# Patient Record
Sex: MALE | Race: WHITE
[De-identification: names, ages, dates, MRNs, and addresses within clinical notes are randomized per-mention and may not be internally consistent; named-entity substitution may affect disease eponyms.]

---

## 2018-12-17 ENCOUNTER — HOSPITAL ENCOUNTER (EMERGENCY)
Dept: HOSPITAL 47 - EC | Age: 38
Discharge: HOME | End: 2018-12-17
Payer: COMMERCIAL

## 2018-12-17 VITALS
DIASTOLIC BLOOD PRESSURE: 80 MMHG | TEMPERATURE: 97.8 F | RESPIRATION RATE: 16 BRPM | SYSTOLIC BLOOD PRESSURE: 127 MMHG | HEART RATE: 82 BPM
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
## 2018-12-17 DIAGNOSIS — F41.9: ICD-10-CM

## 2018-12-17 DIAGNOSIS — Z23: ICD-10-CM
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
## 2018-12-17 DIAGNOSIS — T15.02XA: Primary | ICD-10-CM

## 2018-12-17 DIAGNOSIS — Z86.69: ICD-10-CM

## 2018-12-17 DIAGNOSIS — F17.200: ICD-10-CM

## 2018-12-17 DIAGNOSIS — Z79.899: ICD-10-CM

## 2018-12-17 PROCEDURE — 90471 IMMUNIZATION ADMIN: CPT

## 2018-12-17 PROCEDURE — 65220 REMOVE FOREIGN BODY FROM EYE: CPT

## 2018-12-17 PROCEDURE — 90715 TDAP VACCINE 7 YRS/> IM: CPT

## 2018-12-17 PROCEDURE — 99283 EMERGENCY DEPT VISIT LOW MDM: CPT

## 2018-12-17 NOTE — ED
General Adult HPI





- General


Chief complaint: Eye Problems


Stated complaint: POSS FB IN BOTH EYES


Time Seen by Provider: 12/17/18 12:41


Source: patient, RN notes reviewed


Mode of arrival: ambulatory


Limitations: no limitations





- History of Present Illness


Initial comments: 





38-year-old male presents to the emergency department for the chief complaint 

of foreign body in the left eye.  Patient states he was working on a bench 3 

days ago.  He denies any significant pain at that time however shortly 

afterwards stated his left eye began to hurt.  He states it is making the left 

side of his nose run.  Patient admits to pain with light in both eyes however 

states he believes this is because his left eye associated sensitive.  He 

denies any significant pain in the right eye.  Patient is not up-to-date with 

tetanus vaccination.  He denies any significant visual changes besides 

blurriness due to Pain with opening his left eye. Patient has no other 

complaints at this time including shortness of breath, chest pain, abdominal 

pain, nausea or vomiting, headache. 





- Related Data


 Home Medications











 Medication  Instructions  Recorded  Confirmed


 


Gabapentin [Neurontin] 600 mg PO HS 12/02/15 12/02/15


 


HYDROcodone/APAP 10-325MG [Norco 1 tab PO Q4HR PRN 12/02/15 12/02/15





]   








 Previous Rx's











 Medication  Instructions  Recorded


 


Meclizine [Antivert] 25 mg PO DAILY 10 Days  tab 12/02/15


 


Naproxen 500 mg PO Q12HR 14 Days  tab 12/02/15











 Allergies











Allergy/AdvReac Type Severity Reaction Status Date / Time


 


No Known Allergies Allergy   Verified 12/17/18 12:34














Review of Systems


ROS Statement: 


Those systems with pertinent positive or pertinent negative responses have been 

documented in the HPI.





ROS Other: All systems not noted in ROS Statement are negative.





Past Medical History


Additional Past Medical History / Comment(s): nerve damage, ruptured/herniated 

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


History of Any Multi-Drug Resistant Organisms: None Reported


Past Surgical History: No Surgical Hx Reported


Past Psychological History: ADD/ADHD, Anxiety


Smoking Status: Current every day smoker


Past Alcohol Use History: None Reported


Past Drug Use History: Marijuana





General Exam


Limitations: no limitations


General appearance: alert, in no apparent distress


Head exam: Present: atraumatic, normocephalic, normal inspection


Eye exam: Present: PERRL, EOMI, conjunctival injection (Mild conjunctival 

injection), other (Small sub milimeter foreign body noted in the center of the 

left cornea.  No foreign bodies evident in the right cornea.  Lids were flipped 

on both eyes.  No foreign bodies evident.).  Absent: scleral icterus, 

periorbital swelling


ENT exam: Present: normal exam, mucous membranes moist


Neck exam: Present: normal inspection.  Absent: tenderness, meningismus, 

lymphadenopathy


Respiratory exam: Present: normal lung sounds bilaterally.  Absent: respiratory 

distress, wheezes, rales, rhonchi, stridor


Cardiovascular Exam: Present: regular rate, normal rhythm, normal heart sounds.

  Absent: systolic murmur, diastolic murmur, rubs, gallop, clicks


Neurological exam: Present: alert, oriented X3, CN II-XII intact


Psychiatric exam: Present: normal affect, normal mood





Course


 Vital Signs











  12/17/18 12/17/18 12/17/18





  12:30 13:47 14:29


 


Temperature 97.8 F 97.6 F 97.8 F


 


Pulse Rate 86 81 82


 


Respiratory 16 18 16





Rate   


 


Blood Pressure 147/90 130/87 127/80


 


O2 Sat by Pulse 98 98 98





Oximetry   














Medical Decision Making





- Medical Decision Making





38-year-old male presents to the emergency department for a chief complaint of 

foreign body in the left eye.  Patient states this occurred about 3 days ago.  

He does believe it is metal.  He admits to minor irritation in the right eye 

when looking at bright lights that believes this is due to the irritation in 

his left eye which is significantly more prominent.  On exam patient does have 

a small 7 mm foreign body that appears to be metal in the left center cornea.  

Both lids were flipped on bilateral eyes and no evidence of foreign body under 

eyelids.  Proparacaine was used to numb the left eye.  Q-tip was used to remove 

the foreign body without success.  18-gauge needle did remove the foreign body 

on the first attempt.  However small rust ring does remain.  Jazzy brush was 

used to remove the rust ring.  The eye was then stained with fluorescein stain 

and Wood's lamp was used to visualize the eye.  This is the only area of 

abrasion noted.  Negative Ceasar sign.  Patient was given tetanus shot.  He was 

given erythromycin ointment here.  He does not wear contacts. I did discuss 

urgent follow-up with ophthalmologist due to possible remnants of rust ring as 

I could not completely remove the ring.  However much of the ring was removed 

without difficulty.  Patient will follow-up and return if he has any worsening 

symptoms. 





Disposition


Clinical Impression: 


 Corneal foreign body, Corneal rust ring of left eye





Disposition: HOME SELF-CARE


Condition: Good


Instructions:  Corneal Abrasion (ED), Eye Foreign Body (ED)


Additional Instructions: 


Please follow up with ophthalmology in one to 2 days.  Please use antibiotic 

ointment as directed.  Return to the emergency department if you have any 

worsening symptoms.


Is patient prescribed a controlled substance at d/c from ED?: No


Referrals: 


Michael Farfan MD [Primary Care Provider] - 1-2 days


Francis Estes MD [STAFF PHYSICIAN] - 1-2 days


Time of Disposition: 14:12

## 2019-02-05 ENCOUNTER — HOSPITAL ENCOUNTER (OUTPATIENT)
Dept: HOSPITAL 47 - RADMRIMAIN | Age: 39
Discharge: HOME | End: 2019-02-05
Payer: COMMERCIAL

## 2019-02-05 DIAGNOSIS — H47.212: Primary | ICD-10-CM

## 2019-02-05 DIAGNOSIS — H53.122: ICD-10-CM

## 2019-02-05 PROCEDURE — 70553 MRI BRAIN STEM W/O & W/DYE: CPT

## 2019-02-05 NOTE — MR
EXAMINATION TYPE: MR brain wo/w con

 

DATE OF EXAM: 2/5/2019

 

COMPARISON: CT brain 12/2/2015

 

HISTORY: Optic atrophy, lt sided vision loss

 

TECHNIQUE: 

Multiplanar, multisequence images of the brain and brainstem is performed without and with IV contras
t, utilizing 9.5 mL intravenous Gadavist .

 

FINDINGS: Diffusion weighted images demonstrate no evidence of a recent infarct or other diffusion ab
normality.  There is no extra-axial fluid collection or significant white matter signal abnormality, 
small focus of increased signal on inversion recovery and T2-weighted sequences in the left frontal w
praveena matter on axial image 19 is indeterminate and of questionable clinical significance.  The ventri
cular system and cisternal spaces are normal in size and appearance.  The brain volume is age appropr
iate.

 

Midline structures demonstrate normal morphology.  The craniocervical junction appears within normal 
limits.  Post contrast images demonstrate no abnormal enhancement. The dural venous sinuses appear pa
tent. The visualized sinuses are remarkable for possible polyp or mucus retention cyst left maxillary
 sinus and the globes are intact.

 

IMPRESSION: Nonspecific findings described above. No abnormality evident to account for patient's sym
ptoms.

## 2019-11-06 ENCOUNTER — HOSPITAL ENCOUNTER (EMERGENCY)
Dept: HOSPITAL 47 - EC | Age: 39
Discharge: HOME | End: 2019-11-06
Payer: COMMERCIAL

## 2019-11-06 VITALS — SYSTOLIC BLOOD PRESSURE: 97 MMHG | HEART RATE: 57 BPM | DIASTOLIC BLOOD PRESSURE: 65 MMHG | RESPIRATION RATE: 18 BRPM

## 2019-11-06 VITALS — TEMPERATURE: 97.7 F

## 2019-11-06 DIAGNOSIS — F17.200: ICD-10-CM

## 2019-11-06 DIAGNOSIS — J98.01: ICD-10-CM

## 2019-11-06 DIAGNOSIS — M94.0: Primary | ICD-10-CM

## 2019-11-06 DIAGNOSIS — Z71.6: ICD-10-CM

## 2019-11-06 LAB
ALBUMIN SERPL-MCNC: 4.5 G/DL (ref 3.5–5)
ALP SERPL-CCNC: 91 U/L (ref 38–126)
ALT SERPL-CCNC: 28 U/L (ref 21–72)
ANION GAP SERPL CALC-SCNC: 8 MMOL/L
APTT BLD: 24.4 SEC (ref 22–30)
AST SERPL-CCNC: 24 U/L (ref 17–59)
BASOPHILS # BLD AUTO: 0.1 K/UL (ref 0–0.2)
BASOPHILS NFR BLD AUTO: 1 %
BUN SERPL-SCNC: 13 MG/DL (ref 9–20)
CALCIUM SPEC-MCNC: 10.1 MG/DL (ref 8.4–10.2)
CHLORIDE SERPL-SCNC: 107 MMOL/L (ref 98–107)
CK SERPL-CCNC: 378 U/L (ref 55–170)
CO2 SERPL-SCNC: 24 MMOL/L (ref 22–30)
D DIMER PPP FEU-MCNC: <0.17 MG/L FEU (ref ?–0.6)
EOSINOPHIL # BLD AUTO: 0.1 K/UL (ref 0–0.7)
EOSINOPHIL NFR BLD AUTO: 2 %
ERYTHROCYTE [DISTWIDTH] IN BLOOD BY AUTOMATED COUNT: 4.96 M/UL (ref 4.3–5.9)
ERYTHROCYTE [DISTWIDTH] IN BLOOD: 13 % (ref 11.5–15.5)
GLUCOSE SERPL-MCNC: 95 MG/DL (ref 74–99)
HCT VFR BLD AUTO: 46 % (ref 39–53)
HGB BLD-MCNC: 15.9 GM/DL (ref 13–17.5)
INR PPP: 0.9 (ref ?–1.2)
LYMPHOCYTES # SPEC AUTO: 1.5 K/UL (ref 1–4.8)
LYMPHOCYTES NFR SPEC AUTO: 18 %
MAGNESIUM SPEC-SCNC: 2.1 MG/DL (ref 1.6–2.3)
MCH RBC QN AUTO: 32.1 PG (ref 25–35)
MCHC RBC AUTO-ENTMCNC: 34.6 G/DL (ref 31–37)
MCV RBC AUTO: 92.8 FL (ref 80–100)
MONOCYTES # BLD AUTO: 0.7 K/UL (ref 0–1)
MONOCYTES NFR BLD AUTO: 8 %
NEUTROPHILS # BLD AUTO: 5.5 K/UL (ref 1.3–7.7)
NEUTROPHILS NFR BLD AUTO: 68 %
PLATELET # BLD AUTO: 381 K/UL (ref 150–450)
POTASSIUM SERPL-SCNC: 4.8 MMOL/L (ref 3.5–5.1)
PROT SERPL-MCNC: 7.1 G/DL (ref 6.3–8.2)
PT BLD: 9.8 SEC (ref 9–12)
SODIUM SERPL-SCNC: 139 MMOL/L (ref 137–145)
WBC # BLD AUTO: 8 K/UL (ref 3.8–10.6)

## 2019-11-06 PROCEDURE — 80053 COMPREHEN METABOLIC PANEL: CPT

## 2019-11-06 PROCEDURE — 84443 ASSAY THYROID STIM HORMONE: CPT

## 2019-11-06 PROCEDURE — 85730 THROMBOPLASTIN TIME PARTIAL: CPT

## 2019-11-06 PROCEDURE — 96374 THER/PROPH/DIAG INJ IV PUSH: CPT

## 2019-11-06 PROCEDURE — 85025 COMPLETE CBC W/AUTO DIFF WBC: CPT

## 2019-11-06 PROCEDURE — 99285 EMERGENCY DEPT VISIT HI MDM: CPT

## 2019-11-06 PROCEDURE — 83690 ASSAY OF LIPASE: CPT

## 2019-11-06 PROCEDURE — 94640 AIRWAY INHALATION TREATMENT: CPT

## 2019-11-06 PROCEDURE — 93005 ELECTROCARDIOGRAM TRACING: CPT

## 2019-11-06 PROCEDURE — 84484 ASSAY OF TROPONIN QUANT: CPT

## 2019-11-06 PROCEDURE — 83735 ASSAY OF MAGNESIUM: CPT

## 2019-11-06 PROCEDURE — 85610 PROTHROMBIN TIME: CPT

## 2019-11-06 PROCEDURE — 82550 ASSAY OF CK (CPK): CPT

## 2019-11-06 PROCEDURE — 71046 X-RAY EXAM CHEST 2 VIEWS: CPT

## 2019-11-06 PROCEDURE — 85379 FIBRIN DEGRADATION QUANT: CPT

## 2019-11-06 PROCEDURE — 36415 COLL VENOUS BLD VENIPUNCTURE: CPT

## 2019-11-06 PROCEDURE — 83880 ASSAY OF NATRIURETIC PEPTIDE: CPT

## 2019-11-06 NOTE — XR
EXAMINATION TYPE: XR chest 2V

 

DATE OF EXAM: 11/6/2019

 

COMPARISON: NONE

 

HISTORY: Chest pain

 

TECHNIQUE:  Frontal and lateral views of the chest are obtained.

 

FINDINGS:  

 

There is no focal air space opacity.

 

No evidence for pneumothorax.  No pleural effusion.

 

The cardiac silhouette size is within normal limits.

 

The osseous structures are grossly intact.

 

IMPRESSION:  

 

1.  No acute cardiopulmonary process.

## 2019-11-06 NOTE — ED
Chest Pain HPI





- General


Chief Complaint: Chest Pain


Stated Complaint: chest pain


Time Seen by Provider: 11/06/19 08:16


Source: patient


Mode of arrival: ambulatory


Limitations: no limitations





- History of Present Illness


Initial Comments: 





This is a 38-year-old male who benign past medical history states he's had a 

racing heart sensation or past several days this morning and way to work he 

started feeling left-sided chest pain initially sharp in no apparent: Nature 

mild to moderate in severity he also complains of some dizziness and fatigue 

some slight shortness of breath.  He also states he has some back pain also in 

the same area he is a smoker he denies any recent fevers chills nausea vomiting 

sweats we did have diaphoresis today.  No overt phlegm production.  Feeling 

family history of heart disease was a cousin had a relatively early age.  No 

other modifying factors


MD Complaint: chest pain





- Related Data


                                  Previous Rx's











 Medication  Instructions  Recorded


 


Albuterol Inhaler [Ventolin Hfa 2 puff INHALATION Q6HR PRN #1 11/06/19





Inhaler] inhaler 


 


Ibuprofen 800 mg PO Q6HR PRN #20 tablet 11/06/19











                                    Allergies











Allergy/AdvReac Type Severity Reaction Status Date / Time


 


No Known Allergies Allergy   Verified 11/06/19 09:04














Review of Systems


ROS Statement: 


Those systems with pertinent positive or pertinent negative responses have been 

documented in the HPI.





ROS Other: All systems not noted in ROS Statement are negative.





EKG Findings





- EKG Comments:


EKG Findings:: EKG shows a sinus rhythm a 62 NV interval 126 QRS duration 84 

QT//410 no acute ST-T wave changes





- EKG Results:


EKG: interpreted by MAURO RODARTE, sinus rhythm, normal axis, normal QRS, normal ST

/T, no acute changes





Past Medical History


Additional Past Medical History / Comment(s): nerve damage, ruptured/herniated 

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


History of Any Multi-Drug Resistant Organisms: None Reported


Past Surgical History: No Surgical Hx Reported


Past Psychological History: No Psychological Hx Reported, ADD/ADHD, Anxiety


Smoking Status: Current every day smoker


Past Alcohol Use History: Occasional


Past Drug Use History: Marijuana





General Exam





- General Exam Comments


Initial Comments: 





This is a well-developed well-nourished awake alert oriented 3 male


Limitations: no limitations


General appearance: alert, in no apparent distress


Head exam: Present: atraumatic, normocephalic, normal inspection


Eye exam: Present: normal appearance, PERRL, EOMI.  Absent: scleral icterus, 

conjunctival injection, periorbital swelling


ENT exam: Present: normal exam, mucous membranes moist


Neck exam: Present: normal inspection, full ROM, other (No stridor JVD or 

bruits).  Absent: tenderness, meningismus, lymphadenopathy


Respiratory exam: Present: chest wall tenderness (Reproducible tenderness 

palpation of the left costochondral margin no step-off or crepitation.[id number]), 

decreased breath sounds.  Absent: respiratory distress, wheezes, rales, rhonchi,

stridor


Cardiovascular Exam: Present: regular rate, normal rhythm, normal heart sounds. 

Absent: systolic murmur, diastolic murmur, rubs, gallop, clicks


GI/Abdominal exam: Present: soft, normal bowel sounds.  Absent: distended, 

tenderness, guarding, rebound, rigid


Extremities exam: Present: normal inspection, full ROM, normal capillary refill.

 Absent: tenderness, pedal edema, joint swelling, calf tenderness


Back exam: Present: normal inspection


Neurological exam: Present: alert, oriented X3, CN II-XII intact


Psychiatric exam: Present: normal affect, normal mood


Skin exam: Present: warm, dry, intact, normal color.  Absent: rash





Course


                                   Vital Signs











  11/06/19 11/06/19 11/06/19





  08:12 08:36 08:47


 


Temperature 97.9 F  


 


Pulse Rate 63 64 64


 


Respiratory 18  





Rate   


 


Blood Pressure 127/84  


 


O2 Sat by Pulse 98  





Oximetry   














  11/06/19 11/06/19 11/06/19





  09:00 09:06 09:08


 


Temperature  97.7 F 


 


Pulse Rate 69  


 


Respiratory 15 20 20





Rate   


 


Blood Pressure 97/70  


 


O2 Sat by Pulse 97  





Oximetry   














Procedures





- Smoking Cessation


Time Spent Discussing Smoking Cessation w/Patient (Minutes): 3


Patient Acknowledges Need for Cessation: No





Chest Pain MDM





- MDM





I did review the imaging and report no acute findings patient is feeling 

somewhat improved the presentation is consistent with costochondritis and chest 

wall pain in addition to some bronchospasm.  I did a long discussion with him 

and his family he will be discharged on appropriate medication for the same.  He

was again encouraged to stop smoking





Disposition


Clinical Impression: 


 Costochondritis, Chest wall syndrome, Acute bronchospasm, Smoking





Disposition: HOME SELF-CARE


Condition: Good


Instructions (If sedation given, give patient instructions):  Costochondritis 

(ED), Bronchospasm (ED)


Additional Instructions: 


Prescriptions sent ER [facility] pharmacy


Prescriptions: 


Ibuprofen 800 mg PO Q6HR PRN #20 tablet


 PRN Reason: Pain


Albuterol Inhaler [Ventolin Hfa Inhaler] 2 puff INHALATION Q6HR PRN #1 inhaler


 PRN Reason: Dyspnea


Is patient prescribed a controlled substance at d/c from ED?: No


Referrals: 


None,Stated [Primary Care Provider] - 1-2 days

## 2020-03-08 ENCOUNTER — HOSPITAL ENCOUNTER (INPATIENT)
Dept: HOSPITAL 47 - EC | Age: 40
LOS: 4 days | Discharge: HOME | DRG: 871 | End: 2020-03-12
Attending: HOSPITALIST | Admitting: HOSPITALIST
Payer: COMMERCIAL

## 2020-03-08 ENCOUNTER — HOSPITAL ENCOUNTER (EMERGENCY)
Dept: HOSPITAL 47 - EC | Age: 40
Discharge: HOME | End: 2020-03-08
Payer: COMMERCIAL

## 2020-03-08 VITALS — HEART RATE: 72 BPM | TEMPERATURE: 99.2 F | DIASTOLIC BLOOD PRESSURE: 75 MMHG | SYSTOLIC BLOOD PRESSURE: 113 MMHG

## 2020-03-08 VITALS — RESPIRATION RATE: 18 BRPM

## 2020-03-08 DIAGNOSIS — F17.200: ICD-10-CM

## 2020-03-08 DIAGNOSIS — G62.9: ICD-10-CM

## 2020-03-08 DIAGNOSIS — Z87.39: ICD-10-CM

## 2020-03-08 DIAGNOSIS — J13: ICD-10-CM

## 2020-03-08 DIAGNOSIS — J44.0: ICD-10-CM

## 2020-03-08 DIAGNOSIS — J44.1: ICD-10-CM

## 2020-03-08 DIAGNOSIS — I31.9: Primary | ICD-10-CM

## 2020-03-08 DIAGNOSIS — F90.9: ICD-10-CM

## 2020-03-08 DIAGNOSIS — D72.829: ICD-10-CM

## 2020-03-08 DIAGNOSIS — Z86.69: ICD-10-CM

## 2020-03-08 DIAGNOSIS — F41.9: ICD-10-CM

## 2020-03-08 DIAGNOSIS — A40.3: Primary | ICD-10-CM

## 2020-03-08 DIAGNOSIS — F17.210: ICD-10-CM

## 2020-03-08 LAB
ALBUMIN SERPL-MCNC: 4.6 G/DL (ref 3.5–5)
ALBUMIN SERPL-MCNC: 4.7 G/DL (ref 3.5–5)
ALP SERPL-CCNC: 123 U/L (ref 38–126)
ALP SERPL-CCNC: 126 U/L (ref 38–126)
ALT SERPL-CCNC: 14 U/L (ref 4–49)
ALT SERPL-CCNC: 17 U/L (ref 4–49)
ANION GAP SERPL CALC-SCNC: 13 MMOL/L
ANION GAP SERPL CALC-SCNC: 9 MMOL/L
APTT BLD: 24.8 SEC (ref 22–30)
APTT BLD: 25 SEC (ref 22–30)
AST SERPL-CCNC: 22 U/L (ref 17–59)
AST SERPL-CCNC: 23 U/L (ref 17–59)
BASOPHILS # BLD AUTO: 0.1 K/UL (ref 0–0.2)
BASOPHILS # BLD AUTO: 0.1 K/UL (ref 0–0.2)
BASOPHILS NFR BLD AUTO: 0 %
BASOPHILS NFR BLD AUTO: 0 %
BUN SERPL-SCNC: 10 MG/DL (ref 9–20)
BUN SERPL-SCNC: 10 MG/DL (ref 9–20)
CALCIUM SPEC-MCNC: 9.4 MG/DL (ref 8.4–10.2)
CALCIUM SPEC-MCNC: 9.6 MG/DL (ref 8.4–10.2)
CHLORIDE SERPL-SCNC: 102 MMOL/L (ref 98–107)
CHLORIDE SERPL-SCNC: 99 MMOL/L (ref 98–107)
CO2 SERPL-SCNC: 23 MMOL/L (ref 22–30)
CO2 SERPL-SCNC: 25 MMOL/L (ref 22–30)
D DIMER PPP FEU-MCNC: 0.19 MG/L FEU (ref ?–0.6)
EOSINOPHIL # BLD AUTO: 0.1 K/UL (ref 0–0.7)
EOSINOPHIL # BLD AUTO: 0.3 K/UL (ref 0–0.7)
EOSINOPHIL NFR BLD AUTO: 1 %
EOSINOPHIL NFR BLD AUTO: 1 %
ERYTHROCYTE [DISTWIDTH] IN BLOOD BY AUTOMATED COUNT: 5.19 M/UL (ref 4.3–5.9)
ERYTHROCYTE [DISTWIDTH] IN BLOOD BY AUTOMATED COUNT: 5.2 M/UL (ref 4.3–5.9)
ERYTHROCYTE [DISTWIDTH] IN BLOOD: 12.7 % (ref 11.5–15.5)
ERYTHROCYTE [DISTWIDTH] IN BLOOD: 12.8 % (ref 11.5–15.5)
GLUCOSE SERPL-MCNC: 107 MG/DL (ref 74–99)
GLUCOSE SERPL-MCNC: 97 MG/DL (ref 74–99)
HCT VFR BLD AUTO: 46.8 % (ref 39–53)
HCT VFR BLD AUTO: 47.4 % (ref 39–53)
HGB BLD-MCNC: 15.7 GM/DL (ref 13–17.5)
HGB BLD-MCNC: 15.9 GM/DL (ref 13–17.5)
INR PPP: 0.9 (ref ?–1.2)
INR PPP: 0.9 (ref ?–1.2)
LYMPHOCYTES # SPEC AUTO: 1 K/UL (ref 1–4.8)
LYMPHOCYTES # SPEC AUTO: 1.2 K/UL (ref 1–4.8)
LYMPHOCYTES NFR SPEC AUTO: 4 %
LYMPHOCYTES NFR SPEC AUTO: 6 %
MAGNESIUM SPEC-SCNC: 2.1 MG/DL (ref 1.6–2.3)
MAGNESIUM SPEC-SCNC: 2.2 MG/DL (ref 1.6–2.3)
MCH RBC QN AUTO: 30.2 PG (ref 25–35)
MCH RBC QN AUTO: 30.6 PG (ref 25–35)
MCHC RBC AUTO-ENTMCNC: 33.5 G/DL (ref 31–37)
MCHC RBC AUTO-ENTMCNC: 33.5 G/DL (ref 31–37)
MCV RBC AUTO: 90.1 FL (ref 80–100)
MCV RBC AUTO: 91.3 FL (ref 80–100)
MONOCYTES # BLD AUTO: 1.3 K/UL (ref 0–1)
MONOCYTES # BLD AUTO: 1.5 K/UL (ref 0–1)
MONOCYTES NFR BLD AUTO: 5 %
MONOCYTES NFR BLD AUTO: 7 %
NEUTROPHILS # BLD AUTO: 18.3 K/UL (ref 1.3–7.7)
NEUTROPHILS # BLD AUTO: 23.1 K/UL (ref 1.3–7.7)
NEUTROPHILS NFR BLD AUTO: 85 %
NEUTROPHILS NFR BLD AUTO: 89 %
PLATELET # BLD AUTO: 324 K/UL (ref 150–450)
PLATELET # BLD AUTO: 359 K/UL (ref 150–450)
POTASSIUM SERPL-SCNC: 4.3 MMOL/L (ref 3.5–5.1)
POTASSIUM SERPL-SCNC: 4.8 MMOL/L (ref 3.5–5.1)
PROT SERPL-MCNC: 7.7 G/DL (ref 6.3–8.2)
PROT SERPL-MCNC: 7.7 G/DL (ref 6.3–8.2)
PT BLD: 9.4 SEC (ref 9–12)
PT BLD: 9.9 SEC (ref 9–12)
SODIUM SERPL-SCNC: 135 MMOL/L (ref 137–145)
SODIUM SERPL-SCNC: 136 MMOL/L (ref 137–145)
WBC # BLD AUTO: 21.5 K/UL (ref 3.8–10.6)
WBC # BLD AUTO: 26 K/UL (ref 3.8–10.6)

## 2020-03-08 PROCEDURE — 85027 COMPLETE CBC AUTOMATED: CPT

## 2020-03-08 PROCEDURE — 84484 ASSAY OF TROPONIN QUANT: CPT

## 2020-03-08 PROCEDURE — 71275 CT ANGIOGRAPHY CHEST: CPT

## 2020-03-08 PROCEDURE — 99285 EMERGENCY DEPT VISIT HI MDM: CPT

## 2020-03-08 PROCEDURE — 96375 TX/PRO/DX INJ NEW DRUG ADDON: CPT

## 2020-03-08 PROCEDURE — 94760 N-INVAS EAR/PLS OXIMETRY 1: CPT

## 2020-03-08 PROCEDURE — 96361 HYDRATE IV INFUSION ADD-ON: CPT

## 2020-03-08 PROCEDURE — 94640 AIRWAY INHALATION TREATMENT: CPT

## 2020-03-08 PROCEDURE — 80053 COMPREHEN METABOLIC PANEL: CPT

## 2020-03-08 PROCEDURE — 87070 CULTURE OTHR SPECIMN AEROBIC: CPT

## 2020-03-08 PROCEDURE — 83735 ASSAY OF MAGNESIUM: CPT

## 2020-03-08 PROCEDURE — 87205 SMEAR GRAM STAIN: CPT

## 2020-03-08 PROCEDURE — 80048 BASIC METABOLIC PNL TOTAL CA: CPT

## 2020-03-08 PROCEDURE — 93005 ELECTROCARDIOGRAM TRACING: CPT

## 2020-03-08 PROCEDURE — 85730 THROMBOPLASTIN TIME PARTIAL: CPT

## 2020-03-08 PROCEDURE — 83690 ASSAY OF LIPASE: CPT

## 2020-03-08 PROCEDURE — 87077 CULTURE AEROBIC IDENTIFY: CPT

## 2020-03-08 PROCEDURE — 36415 COLL VENOUS BLD VENIPUNCTURE: CPT

## 2020-03-08 PROCEDURE — 71046 X-RAY EXAM CHEST 2 VIEWS: CPT

## 2020-03-08 PROCEDURE — 87390 HIV-1 AG IA: CPT

## 2020-03-08 PROCEDURE — 96374 THER/PROPH/DIAG INJ IV PUSH: CPT

## 2020-03-08 PROCEDURE — 87040 BLOOD CULTURE FOR BACTERIA: CPT

## 2020-03-08 PROCEDURE — 87186 SC STD MICRODIL/AGAR DIL: CPT

## 2020-03-08 PROCEDURE — 85610 PROTHROMBIN TIME: CPT

## 2020-03-08 PROCEDURE — 85025 COMPLETE CBC W/AUTO DIFF WBC: CPT

## 2020-03-08 PROCEDURE — 87502 INFLUENZA DNA AMP PROBE: CPT

## 2020-03-08 PROCEDURE — 84145 PROCALCITONIN (PCT): CPT

## 2020-03-08 PROCEDURE — 85379 FIBRIN DEGRADATION QUANT: CPT

## 2020-03-08 PROCEDURE — 99284 EMERGENCY DEPT VISIT MOD MDM: CPT

## 2020-03-08 PROCEDURE — 83605 ASSAY OF LACTIC ACID: CPT

## 2020-03-08 NOTE — ED
General Adult HPI





- General


Chief complaint: Chest Pain


Stated complaint: Chest Pain


Time Seen by Provider: 03/08/20 01:27


Source: patient, family


Mode of arrival: wheelchair


Limitations: no limitations





- History of Present Illness


Initial comments: 


40-year-old male patient presents to the emergency department today for 

evaluation of left-sided chest pain.  Patient describes the pain as a sharp 

stabbing pain that goes straight through to his back.  Patient states the pain 

worsens with breathing.  Patient states pain has been going on throughout the d

ay but worsened significantly tonight.  Patient states he is short of breath 

with this.  Patient states he has been sick with upper respiratory symptoms 

including cough, congestion, fever for the last week. Temps as high as 102 

degrees Fahrenheit.  Patient states he is coughing up sputum. Patient states 

that the flu has been going through his house. He denies any history of lung 

conditions. States he does smoke cigarettes. He denies any history of IV drug 

use.  Patient denies any recent rash, abdominal pain, nausea, vomiting, 

diarrhea, constipation, numbness, tingling, dizziness, weakness, hematuria, 

dysuria, urinary urgency, urinary frequency, headache, visual changes, or any 

other complaints.





- Related Data


                                  Previous Rx's











 Medication  Instructions  Recorded


 


Albuterol Inhaler [Ventolin Hfa 2 puff INHALATION Q6HR PRN #1 11/06/19





Inhaler] inhaler 


 


Ibuprofen 800 mg PO Q6HR PRN #20 tablet 11/06/19


 


Colchicine 0.6 mg PO BID #60 capsule 03/08/20


 


Ibuprofen [Motrin] 600 mg PO Q8HR PRN #60 tab 03/08/20











                                    Allergies











Allergy/AdvReac Type Severity Reaction Status Date / Time


 


No Known Allergies Allergy   Verified 03/08/20 01:25














Review of Systems


ROS Statement: 


Those systems with pertinent positive or pertinent negative responses have been 

documented in the HPI.





ROS Other: All systems not noted in ROS Statement are negative.





Past Medical History


Additional Past Medical History / Comment(s): nerve damage, ruptured/herniated 

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


History of Any Multi-Drug Resistant Organisms: None Reported


Past Surgical History: No Surgical Hx Reported


Past Psychological History: No Psychological Hx Reported, ADD/ADHD, Anxiety


Smoking Status: Current every day smoker


Past Alcohol Use History: Occasional


Past Drug Use History: Marijuana





General Exam


Limitations: no limitations


General appearance: alert, in no apparent distress, other (This is a well-devel

oped, well-nourished adult male patient in mild distress related to pain.  Vital

 signs upon presentation are temperature 98.6F, pulse 84, respirations 15, 

blood pressure 135/102, pulse ox 95% on room air per)


Eye exam: Present: normal appearance, PERRL, EOMI.  Absent: scleral icterus, 

conjunctival injection, periorbital swelling


ENT exam: Present: normal exam, normal oropharynx, mucous membranes moist, TM's 

normal bilaterally


Respiratory exam: Present: normal lung sounds bilaterally, respiratory distress 

(Tachypnea, abdominal accessory muscle use), chest wall tenderness (Left-sided).

  Absent: wheezes, rales, rhonchi, stridor


Cardiovascular Exam: Present: regular rate, normal rhythm, normal heart sounds. 

 Absent: systolic murmur, diastolic murmur, rubs, gallop, clicks


GI/Abdominal exam: Present: soft, normal bowel sounds.  Absent: distended, 

tenderness, guarding, rebound, rigid


Neurological exam: Present: alert, oriented X3, CN II-XII intact


Psychiatric exam: Present: normal affect, normal mood


Skin exam: Present: warm, dry, intact, normal color.  Absent: rash





Course


                                   Vital Signs











  03/08/20 03/08/20 03/08/20





  01:22 03:51 04:38


 


Temperature 98.6 F  


 


Pulse Rate 84 75 76


 


Respiratory 15 18 





Rate   


 


Blood Pressure 135/102 111/76 


 


O2 Sat by Pulse 95 97 





Oximetry   














  03/08/20





  04:54


 


Temperature 


 


Pulse Rate 84


 


Respiratory 





Rate 


 


Blood Pressure 


 


O2 Sat by Pulse 





Oximetry 














EKG Findings





- EKG Comments:


EKG Findings:: EKG obtained at 01 26 shows normal sinus rhythm with a 

ventricular rate of 80, VA interval 134, QRS duration 86, , .  No 

evidence of ST elevation or depression.





Medical Decision Making





- Medical Decision Making


40-year-old male patient presents to the emergency department today for 

evaluation of left-sided chest pain.  He describes the pain is sharp stabbing 

and radiating through to the back.  Pain worsens with deep breathing and 

movement.  Labs reviewed and did reveal elevated white blood cell count at 21.5.

  Chest x-ray shows no acute cardiopulmonary process. Troponin negative. V/S 

show no major abnormalities.  He is afebrile.  Oxygen saturation is % on 

room air.  He is given breathing treatments which did not seem to improve his 

symptoms.  He is given a dose of Toradol and morphine which did seem to improve 

his pain.  Patient symptoms seem consistent with pericarditis, possibly 

pleuritis.  He'll be discharged with anti-inflammatory medications for 

treatment.  He is instructed to follow up with his primary care physician for 

recheck in 1-2 days.  Return parameters were discussed in detail.  He verbalizes

 understanding and agrees with this plan.








- Lab Data


Result diagrams: 


                                 03/08/20 01:35





                                 03/08/20 01:35


                                   Lab Results











  03/08/20 03/08/20 03/08/20 Range/Units





  01:35 01:35 01:35 


 


WBC  21.5 H    (3.8-10.6)  k/uL


 


RBC  5.20    (4.30-5.90)  m/uL


 


Hgb  15.7    (13.0-17.5)  gm/dL


 


Hct  46.8    (39.0-53.0)  %


 


MCV  90.1    (80.0-100.0)  fL


 


MCH  30.2    (25.0-35.0)  pg


 


MCHC  33.5    (31.0-37.0)  g/dL


 


RDW  12.7    (11.5-15.5)  %


 


Plt Count  324    (150-450)  k/uL


 


Neutrophils %  85    %


 


Lymphocytes %  6    %


 


Monocytes %  7    %


 


Eosinophils %  1    %


 


Basophils %  0    %


 


Neutrophils #  18.3 H    (1.3-7.7)  k/uL


 


Lymphocytes #  1.2    (1.0-4.8)  k/uL


 


Monocytes #  1.5 H    (0-1.0)  k/uL


 


Eosinophils #  0.1    (0-0.7)  k/uL


 


Basophils #  0.1    (0-0.2)  k/uL


 


PT   9.4   (9.0-12.0)  sec


 


INR   0.9   (<1.2)  


 


APTT   25.0   (22.0-30.0)  sec


 


D-Dimer   0.19   (<0.60)  mg/L FEU


 


Sodium    136 L  (137-145)  mmol/L


 


Potassium    4.8  (3.5-5.1)  mmol/L


 


Chloride    102  ()  mmol/L


 


Carbon Dioxide    25  (22-30)  mmol/L


 


Anion Gap    9  mmol/L


 


BUN    10  (9-20)  mg/dL


 


Creatinine    0.91  (0.66-1.25)  mg/dL


 


Est GFR (CKD-EPI)AfAm    >90  (>60 ml/min/1.73 sqM)  


 


Est GFR (CKD-EPI)NonAf    >90  (>60 ml/min/1.73 sqM)  


 


Glucose    107 H  (74-99)  mg/dL


 


Calcium    9.6  (8.4-10.2)  mg/dL


 


Magnesium    2.2  (1.6-2.3)  mg/dL


 


Total Bilirubin    0.9  (0.2-1.3)  mg/dL


 


AST    23  (17-59)  U/L


 


ALT    17  (4-49)  U/L


 


Alkaline Phosphatase    123  ()  U/L


 


Troponin I     (0.000-0.034)  ng/mL


 


Total Protein    7.7  (6.3-8.2)  g/dL


 


Albumin    4.7  (3.5-5.0)  g/dL


 


Influenza Type A RNA     (Not Detectd)  


 


Influenza Type B (PCR)     (Not Detectd)  














  03/08/20 03/08/20 Range/Units





  01:35 03:48 


 


WBC    (3.8-10.6)  k/uL


 


RBC    (4.30-5.90)  m/uL


 


Hgb    (13.0-17.5)  gm/dL


 


Hct    (39.0-53.0)  %


 


MCV    (80.0-100.0)  fL


 


MCH    (25.0-35.0)  pg


 


MCHC    (31.0-37.0)  g/dL


 


RDW    (11.5-15.5)  %


 


Plt Count    (150-450)  k/uL


 


Neutrophils %    %


 


Lymphocytes %    %


 


Monocytes %    %


 


Eosinophils %    %


 


Basophils %    %


 


Neutrophils #    (1.3-7.7)  k/uL


 


Lymphocytes #    (1.0-4.8)  k/uL


 


Monocytes #    (0-1.0)  k/uL


 


Eosinophils #    (0-0.7)  k/uL


 


Basophils #    (0-0.2)  k/uL


 


PT    (9.0-12.0)  sec


 


INR    (<1.2)  


 


APTT    (22.0-30.0)  sec


 


D-Dimer    (<0.60)  mg/L FEU


 


Sodium    (137-145)  mmol/L


 


Potassium    (3.5-5.1)  mmol/L


 


Chloride    ()  mmol/L


 


Carbon Dioxide    (22-30)  mmol/L


 


Anion Gap    mmol/L


 


BUN    (9-20)  mg/dL


 


Creatinine    (0.66-1.25)  mg/dL


 


Est GFR (CKD-EPI)AfAm    (>60 ml/min/1.73 sqM)  


 


Est GFR (CKD-EPI)NonAf    (>60 ml/min/1.73 sqM)  


 


Glucose    (74-99)  mg/dL


 


Calcium    (8.4-10.2)  mg/dL


 


Magnesium    (1.6-2.3)  mg/dL


 


Total Bilirubin    (0.2-1.3)  mg/dL


 


AST    (17-59)  U/L


 


ALT    (4-49)  U/L


 


Alkaline Phosphatase    ()  U/L


 


Troponin I  <0.012   (0.000-0.034)  ng/mL


 


Total Protein    (6.3-8.2)  g/dL


 


Albumin    (3.5-5.0)  g/dL


 


Influenza Type A RNA   Not Detected  (Not Detectd)  


 


Influenza Type B (PCR)   Not Detected  (Not Detectd)  














- Radiology Data


Radiology results: report reviewed, image reviewed


Two-view x-ray of the chest is obtained.  Report is reviewed in its entirety.  

Impression by Dr. Chase shows mild subsegmental atelectasis right lung base 

that appears new compared to old exam.





Disposition


Clinical Impression: 


 Chest pain, Pericarditis





Disposition: HOME SELF-CARE


Condition: Good


Instructions (If sedation given, give patient instructions):  Acute Pericarditis

 (ED)


Additional Instructions: 


Take medications as directed. Follow up with your primary care physician for 

recheck in 1-2 days.  Return to the emergency department immediately for any 

new, worsening, or concerning symptoms.


Prescriptions: 


Colchicine 0.6 mg PO BID #60 capsule


Ibuprofen [Motrin] 600 mg PO Q8HR PRN #60 tab


 PRN Reason: Pain


Is patient prescribed a controlled substance at d/c from ED?: No


Referrals: 


Michael Farfan MD [Primary Care Provider] - 1-2 days


Time of Disposition: 05:35

## 2020-03-08 NOTE — XR
EXAMINATION TYPE: XR chest 2V

 

DATE OF EXAM: 3/8/2020

 

COMPARISON: 11/6/2019

 

HISTORY: Chest pain

 

TECHNIQUE: 11/6/2019

 

FINDINGS:

Heart is normal. Lungs are clear of consolidation. There is linear density right lung base. There are
 chest leads. Bony thorax is intact.

 

IMPRESSION: There is mild subsegmental atelectasis right lung base that appears new compared to old e
xam.

## 2020-03-08 NOTE — ED
General Adult HPI





- General


Chief complaint: Chest Pain


Stated complaint: Chest pain/SOB


Time Seen by Provider: 03/08/20 22:50


Source: patient, RN notes reviewed, old records reviewed


Mode of arrival: ambulatory


Limitations: no limitations





- History of Present Illness


Initial comments: 





40-year-old male presenting for evaluation of left-sided chest pain.  Patient 

was seen emergency department yesterday for same complaint, he had chest pain 

workup that was negative and was discharged home.  He returns today for 

reevaluation of the same left-sided chest pain which is sharp in nature.  He 

does report a mild cough.  Pain is worse with deep inspiration.  He has no known

history of coronary artery disease.  No history of DVT or PE.  He is a current 

smoker.  No history of asthma or COPD.  Denies fever or chills.  Denies nausea 

or vomiting.  Denies lower extremity pain or swelling.





- Related Data


                                  Previous Rx's











 Medication  Instructions  Recorded


 


Albuterol Inhaler [Ventolin Hfa 2 puff INHALATION Q6HR PRN #1 11/06/19





Inhaler] inhaler 


 


Ibuprofen 800 mg PO Q6HR PRN #20 tablet 11/06/19


 


Colchicine 0.6 mg PO BID #60 capsule 03/08/20


 


Ibuprofen [Motrin] 600 mg PO Q8HR PRN #60 tab 03/08/20











                                    Allergies











Allergy/AdvReac Type Severity Reaction Status Date / Time


 


No Known Allergies Allergy   Verified 03/08/20 22:48














Review of Systems


ROS Statement: 


Those systems with pertinent positive or pertinent negative responses have been 

documented in the HPI.





ROS Other: All systems not noted in ROS Statement are negative.





Past Medical History


Additional Past Medical History / Comment(s): nerve damage, ruptured/herniated 

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


History of Any Multi-Drug Resistant Organisms: None Reported


Past Surgical History: No Surgical Hx Reported


Past Psychological History: No Psychological Hx Reported, ADD/ADHD, Anxiety


Smoking Status: Current every day smoker


Past Alcohol Use History: Occasional


Past Drug Use History: Marijuana





General Exam


Limitations: no limitations


General appearance: alert, in no apparent distress


Head exam: Present: atraumatic, normocephalic


Eye exam: Present: normal appearance, PERRL


ENT exam: Present: normal exam


Neck exam: Present: normal inspection.  Absent: tenderness, meningismus


Respiratory exam: Present: normal lung sounds bilaterally.  Absent: respiratory 

distress, wheezes, chest wall tenderness


Cardiovascular Exam: Present: regular rate, normal rhythm


GI/Abdominal exam: Present: soft.  Absent: distended, tenderness, guarding, 

rebound


Back exam: Present: normal inspection


Neurological exam: Present: alert, oriented X3, CN II-XII intact.  Absent: motor

 sensory deficit


Psychiatric exam: Present: normal affect, normal mood


Skin exam: Present: warm, dry, intact.  Absent: cyanosis, diaphoretic





Course


                                   Vital Signs











  03/08/20





  22:47


 


Temperature 98.2 F


 


Pulse Rate 89


 


Respiratory 20





Rate 


 


Blood Pressure 143/80


 


O2 Sat by Pulse 99





Oximetry 














EKG Findings





- EKG Comments:


EKG Findings:: EKG: Normal sinus rhythm, rate of 90, AK interval 138 QRS 

duration 86, , T-wave inversion 3, no ST segment elevation





Medical Decision Making





- Medical Decision Making





40-year-old male presenting with left-sided chest pain, worse with cough, worse 

with deep inspiration.  Patient had flulike illness with fevers and cough over 

the past one week.  Pain is pleuritic in nature.  CT angiography is performed 

shows bilateral pneumonia.  He has increasing white blood cell count at 26,000. 

 Suspect his pain is from pleurisy secondary to pneumonia.  He did have flulike 

illnesses covered for both community acquired pneumonia as well as post-i

nfluenza pneumonia with gram-positive coverage.  Patient will be admitted for IV

 antibiotics given the significant leukocytosis and the significant amount of 

pain that he is in.  His troponin is negative yesterday and today.  I suspect 

that all of his pain complaint can be related to pleurisy.  He has no PE on CT 

angiography.








Diagnosis: Bilateral pneumonia, pleurisy.





- Lab Data


Result diagrams: 


                                 03/08/20 22:57





                                 03/08/20 22:57


                                   Lab Results











  03/08/20 03/08/20 03/08/20 Range/Units





  22:57 22:57 22:57 


 


WBC  26.0 H    (3.8-10.6)  k/uL


 


RBC  5.19    (4.30-5.90)  m/uL


 


Hgb  15.9    (13.0-17.5)  gm/dL


 


Hct  47.4    (39.0-53.0)  %


 


MCV  91.3    (80.0-100.0)  fL


 


MCH  30.6    (25.0-35.0)  pg


 


MCHC  33.5    (31.0-37.0)  g/dL


 


RDW  12.8    (11.5-15.5)  %


 


Plt Count  359    (150-450)  k/uL


 


Neutrophils %  89    %


 


Lymphocytes %  4    %


 


Monocytes %  5    %


 


Eosinophils %  1    %


 


Basophils %  0    %


 


Neutrophils #  23.1 H    (1.3-7.7)  k/uL


 


Lymphocytes #  1.0    (1.0-4.8)  k/uL


 


Monocytes #  1.3 H    (0-1.0)  k/uL


 


Eosinophils #  0.3    (0-0.7)  k/uL


 


Basophils #  0.1    (0-0.2)  k/uL


 


PT   9.9   (9.0-12.0)  sec


 


INR   0.9   (<1.2)  


 


APTT   24.8   (22.0-30.0)  sec


 


Sodium    135 L  (137-145)  mmol/L


 


Potassium    4.3  (3.5-5.1)  mmol/L


 


Chloride    99  ()  mmol/L


 


Carbon Dioxide    23  (22-30)  mmol/L


 


Anion Gap    13  mmol/L


 


BUN    10  (9-20)  mg/dL


 


Creatinine    0.95  (0.66-1.25)  mg/dL


 


Est GFR (CKD-EPI)AfAm    >90  (>60 ml/min/1.73 sqM)  


 


Est GFR (CKD-EPI)NonAf    >90  (>60 ml/min/1.73 sqM)  


 


Glucose    97  (74-99)  mg/dL


 


Calcium    9.4  (8.4-10.2)  mg/dL


 


Magnesium    2.1  (1.6-2.3)  mg/dL


 


Total Bilirubin    1.4 H  (0.2-1.3)  mg/dL


 


AST    22  (17-59)  U/L


 


ALT    14  (4-49)  U/L


 


Alkaline Phosphatase    126  ()  U/L


 


Troponin I     (0.000-0.034)  ng/mL


 


Total Protein    7.7  (6.3-8.2)  g/dL


 


Albumin    4.6  (3.5-5.0)  g/dL


 


Lipase    304 H  ()  U/L














  03/08/20 Range/Units





  22:57 


 


WBC   (3.8-10.6)  k/uL


 


RBC   (4.30-5.90)  m/uL


 


Hgb   (13.0-17.5)  gm/dL


 


Hct   (39.0-53.0)  %


 


MCV   (80.0-100.0)  fL


 


MCH   (25.0-35.0)  pg


 


MCHC   (31.0-37.0)  g/dL


 


RDW   (11.5-15.5)  %


 


Plt Count   (150-450)  k/uL


 


Neutrophils %   %


 


Lymphocytes %   %


 


Monocytes %   %


 


Eosinophils %   %


 


Basophils %   %


 


Neutrophils #   (1.3-7.7)  k/uL


 


Lymphocytes #   (1.0-4.8)  k/uL


 


Monocytes #   (0-1.0)  k/uL


 


Eosinophils #   (0-0.7)  k/uL


 


Basophils #   (0-0.2)  k/uL


 


PT   (9.0-12.0)  sec


 


INR   (<1.2)  


 


APTT   (22.0-30.0)  sec


 


Sodium   (137-145)  mmol/L


 


Potassium   (3.5-5.1)  mmol/L


 


Chloride   ()  mmol/L


 


Carbon Dioxide   (22-30)  mmol/L


 


Anion Gap   mmol/L


 


BUN   (9-20)  mg/dL


 


Creatinine   (0.66-1.25)  mg/dL


 


Est GFR (CKD-EPI)AfAm   (>60 ml/min/1.73 sqM)  


 


Est GFR (CKD-EPI)NonAf   (>60 ml/min/1.73 sqM)  


 


Glucose   (74-99)  mg/dL


 


Calcium   (8.4-10.2)  mg/dL


 


Magnesium   (1.6-2.3)  mg/dL


 


Total Bilirubin   (0.2-1.3)  mg/dL


 


AST   (17-59)  U/L


 


ALT   (4-49)  U/L


 


Alkaline Phosphatase   ()  U/L


 


Troponin I  <0.012  (0.000-0.034)  ng/mL


 


Total Protein   (6.3-8.2)  g/dL


 


Albumin   (3.5-5.0)  g/dL


 


Lipase   ()  U/L














Disposition


Clinical Impression: 


 Pleurisy, Bilateral pneumonia





Disposition: ADMITTED AS IP TO THIS [facility]


Condition: Stable


Is patient prescribed a controlled substance at d/c from ED?: No


Referrals: 


Michael Farfan MD [Primary Care Provider] - 1-2 days


Decision to Admit Reason: Admit from EC


Decision Date: 03/08/20


Decision Time: 23:57

## 2020-03-08 NOTE — CT
EXAMINATION TYPE: CT angio chest

 

DATE OF EXAM: 3/8/2020

 

COMPARISON: None

 

HISTORY: SOB

 

CT DLP: 407.9 mGycm

Automated exposure control for dose reduction was used.

 

CONTRAST: 

Performed with IV Contrast, patient injected with 80 mL of Isovue 370.

 

Our 3-D post processed images.

 

There is no mediastinal adenopathy. Thoracic aorta is intact. There is no aneurysm or dissection. The
re are multiple bilateral hilar lymph nodes that measure up to 1.5 cm. Heart size is normal. There is
 no pericardial effusion. There is some wedge-shaped lingula consolidation of the left upper lobe. Th
ere is some patchy infiltrate and atelectasis left posterior lung base. There is mild patchy atelecta
sis right posterior lung base. There is no pleural effusion.

 

I see no filling defects in the pulmonary arteries.

 

Thoracic spine is intact. Bony thorax is intact. The upper abdominal soft tissues are intact.

 

IMPRESSION:

Bilateral patchy pneumonia and atelectasis as above. No evidence of pulmonary embolism. Normal heart.
 There are bilateral bronchial lymph nodes more likely related to inflammatory disease.

## 2020-03-09 RX ADMIN — NAPROXEN SCH: 250 TABLET ORAL at 21:50

## 2020-03-09 RX ADMIN — IPRATROPIUM BROMIDE AND ALBUTEROL SULFATE SCH: .5; 3 SOLUTION RESPIRATORY (INHALATION) at 16:55

## 2020-03-09 RX ADMIN — METHYLPREDNISOLONE SODIUM SUCCINATE SCH MG: 40 INJECTION, POWDER, FOR SOLUTION INTRAMUSCULAR; INTRAVENOUS at 17:58

## 2020-03-09 RX ADMIN — IPRATROPIUM BROMIDE AND ALBUTEROL SULFATE SCH: .5; 3 SOLUTION RESPIRATORY (INHALATION) at 20:32

## 2020-03-09 RX ADMIN — BUDESONIDE SCH: 1 SUSPENSION RESPIRATORY (INHALATION) at 20:32

## 2020-03-09 RX ADMIN — MORPHINE SULFATE PRN MG: 4 INJECTION, SOLUTION INTRAMUSCULAR; INTRAVENOUS at 05:39

## 2020-03-09 RX ADMIN — SODIUM CHLORIDE SCH MLS/HR: 9 INJECTION, SOLUTION INTRAVENOUS at 14:12

## 2020-03-09 RX ADMIN — FAMOTIDINE SCH MG: 20 TABLET, FILM COATED ORAL at 21:42

## 2020-03-09 RX ADMIN — POTASSIUM CHLORIDE SCH MLS/HR: 14.9 INJECTION, SOLUTION INTRAVENOUS at 21:43

## 2020-03-09 RX ADMIN — PIPERACILLIN AND TAZOBACTAM SCH MLS/HR: 3; .375 INJECTION, POWDER, FOR SOLUTION INTRAVENOUS at 17:58

## 2020-03-09 RX ADMIN — NAPROXEN SCH MG: 250 TABLET ORAL at 17:57

## 2020-03-09 RX ADMIN — ENOXAPARIN SODIUM SCH: 40 INJECTION SUBCUTANEOUS at 17:56

## 2020-03-09 RX ADMIN — POTASSIUM CHLORIDE SCH MLS/HR: 14.9 INJECTION, SOLUTION INTRAVENOUS at 17:58

## 2020-03-09 RX ADMIN — MORPHINE SULFATE PRN MG: 4 INJECTION, SOLUTION INTRAMUSCULAR; INTRAVENOUS at 09:50

## 2020-03-09 RX ADMIN — FAMOTIDINE SCH: 20 TABLET, FILM COATED ORAL at 15:53

## 2020-03-09 RX ADMIN — CEFAZOLIN SCH MLS/HR: 330 INJECTION, POWDER, FOR SOLUTION INTRAMUSCULAR; INTRAVENOUS at 00:10

## 2020-03-09 RX ADMIN — MORPHINE SULFATE PRN MG: 4 INJECTION, SOLUTION INTRAMUSCULAR; INTRAVENOUS at 01:50

## 2020-03-09 RX ADMIN — MORPHINE SULFATE PRN MG: 4 INJECTION, SOLUTION INTRAMUSCULAR; INTRAVENOUS at 14:12

## 2020-03-09 RX ADMIN — CEFAZOLIN SCH MLS/HR: 330 INJECTION, POWDER, FOR SOLUTION INTRAMUSCULAR; INTRAVENOUS at 14:12

## 2020-03-09 RX ADMIN — IPRATROPIUM BROMIDE AND ALBUTEROL SULFATE SCH: .5; 3 SOLUTION RESPIRATORY (INHALATION) at 16:31

## 2020-03-09 RX ADMIN — BUDESONIDE SCH: 1 SUSPENSION RESPIRATORY (INHALATION) at 16:32

## 2020-03-09 NOTE — P.HPIM
History of Present Illness


H&P Date: 03/09/20


Chief Complaint: Cough, sputum





History of presenting complaint:


This is a pleasant 40 year patient of Dr. mason from [facility].  Patient 

normally in good health except for herniated disc and some neuropathy from 

there.  Patient's a .  Initially patient's 6-year-old son got sick and the

mother got sick.  For about a week.  Patient started of with vomiting, cough, 

fever of 103., tired rundown.  Also some diarrhea and aching muscles..  Patient 

come to the ER yesterday.  Patient in the ER was felt to be pericarditis and was

discharged home on colchicine and Motrin.


Patient presents was still cough yellow-brown sputum, significant left-sided 

pleuritic chest pain.  Predominantly with deep breathing or with coughing.  

Appetite has not been good.  Tired rundown.  Admitted.





Review of systems:


GEN.:  Tired rundown, fever


EYES: None


HEENT: None


NECK: None


RESPIRATORY: As above


CARDIOVASCULAR: None


GASTROINTESTINAL: None


GENITOURINARY: None


MUSCULOSKELETAL: Chronic low back pain]


LYMPHATICS: None


HEMATOLOGICAL: None  


PSYCHIATRY: None


NEUROLOGICAL: None





Past medical history to include:


Continue low back disc with some radiculopathy, ADHD, anxiety.





Social history:


Patient is a .  .  Is a 6-year-old child at home.  Does marijuana 

every other day.  Smoking average of 2 packs a day for over 25 years.  Denies 

use of any other drugs.





Family history:


Reviewed, noncontributory to presentation





Physical examination:


VITAL SIGNS: 98.6, 84, 15, 135/102, 95% on 2 L-open presentation


GENERAL: BMI 29.5, sitting up, tired.


EYES: Pupils equal.  Conjunctiva normal.


HEENT: External appearance of nose and ears normal, oral cavity grossly normal.


NECK: JVD not raised; masses not palpable.


HEART: First and second heart sounds are normal;  no edema.  No pericardial rub


LUNGS: Respiratory rate increased, decreased breath sounds some wheezing some co

arse breath sounds.  


ABDOMEN: Soft,  nontender, liver spleen not palpable, no masses palpable.  


PSYCH: [Alert and oriented x3;  mood  and affect anxious.  


NEUROLOGICAL: Cranial nerves grossly intact; no facial asymmetry,   power and 

sensation grossly intact. 


LYMPHATICS: No lymph nodes palpable in the axilla and neck





INVESTIGATIONS, reviewed in the clinical context:


White count 26 hemoglobin 15.9, increased neutrophils, potassium 4.3, crit 0.95.


Influenza type A and B both negative.


EKG tracing-personally reviewed by me.  No ST segment changes sinus rhythm.  No 

evidence of pericarditis.


Chest CTA-bilateral infiltrates





Assessment:


-Acute bilateral pneumonitis with secondary bacterial infection.  Possibly 

started as a viral infection from his son to his wife and himself.  Possibly 

sepsis


-Acute COPD exacerbation in a smoker


-Chronic nicotine dependence patient cigarette smoker


-Recreational marijuana use


-Left-sided pleurisy.





Plan:


Patient be started on IV Zosyn every 6.  Nebulized bronchodilators, inhaled and 

IV steroids.  Sputum will be sent off for Gram stain and culture.  IV fluids.  

Patient reassured.  Lovenox for DVT prophylaxis.  Nicotine patch.








Past Medical History


Additional Past Medical History / Comment(s): nerve damage, ruptured/herniated 

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


History of Any Multi-Drug Resistant Organisms: None Reported


Past Surgical History: No Surgical Hx Reported


Past Psychological History: No Psychological Hx Reported, ADD/ADHD, Anxiety


Smoking Status: Current every day smoker


Past Alcohol Use History: Occasional


Additional Past Alcohol Use History / Comment(s): Patient reports not having 

smoked in the past 3 days


Past Drug Use History: Marijuana





Medications and Allergies


                                Home Medications











 Medication  Instructions  Recorded  Confirmed  Type


 


No Known Home Medications  03/09/20 03/09/20 History








                                    Allergies











Allergy/AdvReac Type Severity Reaction Status Date / Time


 


No Known Allergies Allergy   Verified 03/09/20 10:32














Physical Exam


Vitals: 


                                   Vital Signs











  Temp Pulse Pulse Resp BP BP Pulse Ox


 


 03/09/20 08:28  98.8 F   75  16   114/77  95


 


 03/09/20 01:13  98.6 F   82  14   108/67  96


 


 03/09/20 00:24  98.6 F  86   18  124/86   97


 


 03/08/20 22:47  98.2 F  89   20  143/80   99








                                Intake and Output











 03/08/20 03/09/20 03/09/20





 22:59 06:59 14:59


 


Intake Total  250 


 


Balance  250 


 


Intake:   


 


  Intake, IV Titration  250 





  Amount   


 


    Vancomycin 1,500 mg In  250 





    Sodium Chloride 0.9% 250   





    ml @ 125 mls/hr IVPB ONCE   





    ONE Rx#:[id number]   


 


Other:   


 


  Weight 90.718 kg 90.718 kg 














Results


CBC & Chem 7: 


                                 03/08/20 22:57





                                 03/08/20 22:57


Labs: 


                  Abnormal Lab Results - Last 24 Hours (Table)











  03/08/20 03/08/20 Range/Units





  22:57 22:57 


 


WBC  26.0 H   (3.8-10.6)  k/uL


 


Neutrophils #  23.1 H   (1.3-7.7)  k/uL


 


Monocytes #  1.3 H   (0-1.0)  k/uL


 


Sodium   135 L  (137-145)  mmol/L


 


Total Bilirubin   1.4 H  (0.2-1.3)  mg/dL


 


Lipase   304 H  ()  U/L














Thrombosis Risk Factor Assmnt





- Choose All That Apply


Any of the Below Risk Factors Present?: No


Other Risk Factors: No


Other congenital or acquired thrombophilia - If yes, enter type in comment: No


Thrombosis Risk Factor Assessment Level: Very Low Risk

## 2020-03-10 LAB
ANION GAP SERPL CALC-SCNC: 11 MMOL/L
BUN SERPL-SCNC: 16 MG/DL (ref 9–20)
CALCIUM SPEC-MCNC: 9.5 MG/DL (ref 8.4–10.2)
CHLORIDE SERPL-SCNC: 101 MMOL/L (ref 98–107)
CO2 SERPL-SCNC: 27 MMOL/L (ref 22–30)
ERYTHROCYTE [DISTWIDTH] IN BLOOD BY AUTOMATED COUNT: 4.76 M/UL (ref 4.3–5.9)
ERYTHROCYTE [DISTWIDTH] IN BLOOD: 12.8 % (ref 11.5–15.5)
GLUCOSE SERPL-MCNC: 113 MG/DL (ref 74–99)
HCT VFR BLD AUTO: 44.5 % (ref 39–53)
HGB BLD-MCNC: 14.6 GM/DL (ref 13–17.5)
MCH RBC QN AUTO: 30.5 PG (ref 25–35)
MCHC RBC AUTO-ENTMCNC: 32.7 G/DL (ref 31–37)
MCV RBC AUTO: 93.3 FL (ref 80–100)
PLATELET # BLD AUTO: 418 K/UL (ref 150–450)
POTASSIUM SERPL-SCNC: 5.1 MMOL/L (ref 3.5–5.1)
SODIUM SERPL-SCNC: 139 MMOL/L (ref 137–145)
WBC # BLD AUTO: 20.5 K/UL (ref 3.8–10.6)

## 2020-03-10 RX ADMIN — IPRATROPIUM BROMIDE AND ALBUTEROL SULFATE SCH: .5; 3 SOLUTION RESPIRATORY (INHALATION) at 03:50

## 2020-03-10 RX ADMIN — IPRATROPIUM BROMIDE AND ALBUTEROL SULFATE SCH: .5; 3 SOLUTION RESPIRATORY (INHALATION) at 20:23

## 2020-03-10 RX ADMIN — METHYLPREDNISOLONE SODIUM SUCCINATE SCH MG: 40 INJECTION, POWDER, FOR SOLUTION INTRAMUSCULAR; INTRAVENOUS at 16:16

## 2020-03-10 RX ADMIN — IPRATROPIUM BROMIDE AND ALBUTEROL SULFATE SCH: .5; 3 SOLUTION RESPIRATORY (INHALATION) at 07:38

## 2020-03-10 RX ADMIN — METHYLPREDNISOLONE SODIUM SUCCINATE SCH MG: 40 INJECTION, POWDER, FOR SOLUTION INTRAMUSCULAR; INTRAVENOUS at 00:30

## 2020-03-10 RX ADMIN — IPRATROPIUM BROMIDE AND ALBUTEROL SULFATE SCH ML: .5; 3 SOLUTION RESPIRATORY (INHALATION) at 15:20

## 2020-03-10 RX ADMIN — NICOTINE SCH PATCH: 21 PATCH, EXTENDED RELEASE TRANSDERMAL at 21:25

## 2020-03-10 RX ADMIN — PIPERACILLIN AND TAZOBACTAM SCH MLS/HR: 3; .375 INJECTION, POWDER, FOR SOLUTION INTRAVENOUS at 07:58

## 2020-03-10 RX ADMIN — BUDESONIDE SCH MG: 1 SUSPENSION RESPIRATORY (INHALATION) at 20:24

## 2020-03-10 RX ADMIN — IPRATROPIUM BROMIDE AND ALBUTEROL SULFATE SCH: .5; 3 SOLUTION RESPIRATORY (INHALATION) at 01:29

## 2020-03-10 RX ADMIN — ENOXAPARIN SODIUM SCH MG: 40 INJECTION SUBCUTANEOUS at 07:58

## 2020-03-10 RX ADMIN — POTASSIUM CHLORIDE SCH: 14.9 INJECTION, SOLUTION INTRAVENOUS at 04:47

## 2020-03-10 RX ADMIN — IPRATROPIUM BROMIDE AND ALBUTEROL SULFATE SCH ML: .5; 3 SOLUTION RESPIRATORY (INHALATION) at 20:24

## 2020-03-10 RX ADMIN — BUDESONIDE SCH MG: 1 SUSPENSION RESPIRATORY (INHALATION) at 07:36

## 2020-03-10 RX ADMIN — SODIUM CHLORIDE SCH MLS/HR: 9 INJECTION, SOLUTION INTRAVENOUS at 21:26

## 2020-03-10 RX ADMIN — FAMOTIDINE SCH MG: 20 TABLET, FILM COATED ORAL at 21:25

## 2020-03-10 RX ADMIN — SODIUM CHLORIDE SCH MLS/HR: 9 INJECTION, SOLUTION INTRAVENOUS at 04:48

## 2020-03-10 RX ADMIN — FAMOTIDINE SCH MG: 20 TABLET, FILM COATED ORAL at 07:58

## 2020-03-10 RX ADMIN — POTASSIUM CHLORIDE SCH MLS/HR: 14.9 INJECTION, SOLUTION INTRAVENOUS at 12:06

## 2020-03-10 RX ADMIN — POTASSIUM CHLORIDE SCH: 14.9 INJECTION, SOLUTION INTRAVENOUS at 22:58

## 2020-03-10 RX ADMIN — IPRATROPIUM BROMIDE AND ALBUTEROL SULFATE SCH: .5; 3 SOLUTION RESPIRATORY (INHALATION) at 11:11

## 2020-03-10 RX ADMIN — SODIUM CHLORIDE SCH MLS/HR: 9 INJECTION, SOLUTION INTRAVENOUS at 13:00

## 2020-03-10 RX ADMIN — PIPERACILLIN AND TAZOBACTAM SCH MLS/HR: 3; .375 INJECTION, POWDER, FOR SOLUTION INTRAVENOUS at 00:30

## 2020-03-10 RX ADMIN — IPRATROPIUM BROMIDE AND ALBUTEROL SULFATE SCH ML: .5; 3 SOLUTION RESPIRATORY (INHALATION) at 12:59

## 2020-03-10 RX ADMIN — PIPERACILLIN AND TAZOBACTAM SCH MLS/HR: 3; .375 INJECTION, POWDER, FOR SOLUTION INTRAVENOUS at 16:16

## 2020-03-10 RX ADMIN — METHYLPREDNISOLONE SODIUM SUCCINATE SCH MG: 40 INJECTION, POWDER, FOR SOLUTION INTRAMUSCULAR; INTRAVENOUS at 07:58

## 2020-03-10 RX ADMIN — NICOTINE SCH PATCH: 21 PATCH, EXTENDED RELEASE TRANSDERMAL at 12:07

## 2020-03-10 RX ADMIN — BUDESONIDE SCH: 1 SUSPENSION RESPIRATORY (INHALATION) at 20:22

## 2020-03-10 RX ADMIN — NAPROXEN SCH: 250 TABLET ORAL at 07:53

## 2020-03-10 NOTE — P.PN
Progress Note - Text


Progress Note Date: 03/10/20





Chief Complaint: Cough, sputum





History of presenting complaint:


This is a pleasant 40 year patient of Dr. mason from [facility].  Patient 

normally in good health except for herniated disc and some neuropathy from 

there.  Patient's a .  Initially patient's 6-year-old son got sick and the

mother got sick.  For about a week.  Patient started of with vomiting, cough, 

fever of 103., tired rundown.  Also some diarrhea and aching muscles..  Patient 

come to the ER yesterday.  Patient in the ER was felt to be pericarditis and was

discharged home on colchicine and Motrin.


Patient presents was still cough yellow-brown sputum, significant left-sided 

pleuritic chest pain.  Predominantly with deep breathing or with coughing.  

Appetite has not been good.  Tired rundown.  Admitted.


Admitted with bilateral pneumonia, positive blood cultures for S pneumoniae.


Today-.  He is slightly better.  Still coughing up roxane sputum.  Patient had 

been reluctant to take his bronchodilators.  l pleuritic pain is improving.  On 

IV Toradol.  Wife at the bedside.





Review of systems: Was done for constitutional, cardiovascular, GI, pulmonary. 

relevant finding as above





Active Medications





Acetaminophen (Tylenol Tab)  650 mg PO Q6HR PRN


   PRN Reason: Mild Pain or Fever > 100.5


   Last Admin: 03/09/20 19:53 Dose:  650 mg


   Documented by: 


Albuterol/Ipratropium (Duoneb 0.5 Mg-3 Mg/3 Ml Soln)  3 ml INHALATION RT-QID [facility]


   Last Admin: 03/10/20 20:24 Dose:  3 ml


   Documented by: 


Budesonide (Pulmicort)  1 mg INHALATION RT-BID [facility]


   Last Admin: 03/10/20 20:24 Dose:  1 mg


   Documented by: 


Enoxaparin Sodium (Lovenox)  40 mg SQ DAILY [facility]


   Last Admin: 03/10/20 07:58 Dose:  40 mg


   Documented by: 


Famotidine (Pepcid)  20 mg PO BID [facility]


   Last Admin: 03/10/20 21:25 Dose:  20 mg


   Documented by: 


Piperacillin Sod/Tazobactam (Sod 3.375 gm/ Sodium Chloride)  100 mls @ 25 mls/hr

IVPB Q8H [facility]


   Last Admin: 03/10/20 16:16 Dose:  25 mls/hr


   Documented by: 


Lactated Ringer's (Lactated Ringers)  1,000 mls @ 125 mls/hr IV .Q8H [facility]


   Last Admin: 03/10/20 22:58 Dose:  Not Given


   Documented by: 


Vancomycin HCl 1,500 mg/ (Sodium Chloride)  250 mls @ 125 mls/hr IVPB Q8H [facility]


   Last Admin: 03/10/20 21:26 Dose:  125 mls/hr


   Documented by: 


Ketorolac Tromethamine (Toradol)  15 mg IVP Q6HR PRN


   PRN Reason: Pain


   Stop: 03/14/20 07:51


   Last Admin: 03/10/20 07:59 Dose:  15 mg


   Documented by: 


Methylprednisolone Sodium Succinate (Solu-Medrol)  40 mg IV Q8HR [facility]


   Last Admin: 03/10/20 16:16 Dose:  40 mg


   Documented by: 


Miscellaneous Information (Vancomycin Trough Due)  0 each MISCELLANE AS DIRECTED

ONE


   Stop: 03/11/20 13:01


Naloxone HCl (Narcan)  0.2 mg IV Q2M PRN


   PRN Reason: Opioid Reversal


Nicotine (Habitrol 21mg/24hr Patch)  1 patch TRANSDERM DAILY [facility]


   Last Admin: 03/10/20 21:25 Dose:  1 patch


   Documented by: 


Nicotine Polacrilex (Nicorette Gum)  2 mg BUCCAL Q4HR PRN


   PRN Reason: Nicotine Cravings











Physical examination:


VITAL SIGNS: 97.8, 77, 18, 143/81, 94% on room air


GENERAL: Sitting on bed, looking a bit better


EYES: Pupils equal.  Conjunctiva normal.


HEENT: External appearance of nose and ears normal, oral cavity grossly normal.


NECK: JVD not raised; masses not palpable.


HEART: First and second heart sounds are normal;  no edema.  No pericardial rub


LUNGS: Respiratory rate increased, decreased breath sounds some wheezing


ABDOMEN: Soft,  nontender, liver spleen not palpable, no masses palpable.  


PSYCH: [Alert and oriented x3;  mood  and affect anxious.  








INVESTIGATIONS, reviewed in the clinical context:


White count 20.5 hemoglobin 14.6 potassium 5.1


Problem calcitonin 0.67


Blood culture-streptococcus pneumoniae


Previous testing


White count 26 hemoglobin 15.9, increased neutrophils, potassium 4.3, crit 0.95.


Influenza type A and B both negative.


EKG tracing-personally reviewed by me.  No ST segment changes sinus rhythm.  No 

evidence of pericarditis.


Chest CTA-bilateral infiltrates





Assessment:


-Acute bilateral S pneumoniae with positive blood cultures causing sepsis, POA


-Acute COPD exacerbation in a smoker


-Chronic nicotine dependence patient cigarette smoker


-Recreational marijuana use


-Left-sided pleurisy.





Plan:


Patient clinically responding to Zosyn.  We'll DC the vancomycin.  ID was 

consulted.  D counseling was done with the patient and wife questions were 

answered.  Did explain it was not safe for patient to leave today.  Admitted to 

the hospital at least for at least another 24 hours.  Patient also refuses 

bronchodilators and counseling for the same.  Total time spent today was about 

45 minutes over 25 minutes of discussion

## 2020-03-11 LAB
ANION GAP SERPL CALC-SCNC: 10 MMOL/L
BUN SERPL-SCNC: 17 MG/DL (ref 9–20)
CALCIUM SPEC-MCNC: 9.6 MG/DL (ref 8.4–10.2)
CHLORIDE SERPL-SCNC: 105 MMOL/L (ref 98–107)
CO2 SERPL-SCNC: 25 MMOL/L (ref 22–30)
ERYTHROCYTE [DISTWIDTH] IN BLOOD BY AUTOMATED COUNT: 4.3 M/UL (ref 4.3–5.9)
ERYTHROCYTE [DISTWIDTH] IN BLOOD: 12.8 % (ref 11.5–15.5)
GLUCOSE BLD-MCNC: 131 MG/DL (ref 75–99)
GLUCOSE SERPL-MCNC: 115 MG/DL (ref 74–99)
HCT VFR BLD AUTO: 39.2 % (ref 39–53)
HGB BLD-MCNC: 13.2 GM/DL (ref 13–17.5)
MCH RBC QN AUTO: 30.6 PG (ref 25–35)
MCHC RBC AUTO-ENTMCNC: 33.6 G/DL (ref 31–37)
MCV RBC AUTO: 91.1 FL (ref 80–100)
PLATELET # BLD AUTO: 510 K/UL (ref 150–450)
POTASSIUM SERPL-SCNC: 4.6 MMOL/L (ref 3.5–5.1)
SODIUM SERPL-SCNC: 140 MMOL/L (ref 137–145)
WBC # BLD AUTO: 33.1 K/UL (ref 3.8–10.6)

## 2020-03-11 RX ADMIN — PIPERACILLIN AND TAZOBACTAM SCH MLS/HR: 3; .375 INJECTION, POWDER, FOR SOLUTION INTRAVENOUS at 00:50

## 2020-03-11 RX ADMIN — NICOTINE SCH PATCH: 21 PATCH, EXTENDED RELEASE TRANSDERMAL at 09:04

## 2020-03-11 RX ADMIN — PIPERACILLIN AND TAZOBACTAM SCH MLS/HR: 3; .375 INJECTION, POWDER, FOR SOLUTION INTRAVENOUS at 09:05

## 2020-03-11 RX ADMIN — BUDESONIDE SCH MG: 1 SUSPENSION RESPIRATORY (INHALATION) at 08:25

## 2020-03-11 RX ADMIN — BUDESONIDE SCH MG: 1 SUSPENSION RESPIRATORY (INHALATION) at 20:30

## 2020-03-11 RX ADMIN — IPRATROPIUM BROMIDE AND ALBUTEROL SULFATE SCH ML: .5; 3 SOLUTION RESPIRATORY (INHALATION) at 08:24

## 2020-03-11 RX ADMIN — FAMOTIDINE SCH MG: 20 TABLET, FILM COATED ORAL at 09:05

## 2020-03-11 RX ADMIN — POTASSIUM CHLORIDE SCH MLS/HR: 14.9 INJECTION, SOLUTION INTRAVENOUS at 09:06

## 2020-03-11 RX ADMIN — IPRATROPIUM BROMIDE AND ALBUTEROL SULFATE SCH ML: .5; 3 SOLUTION RESPIRATORY (INHALATION) at 16:25

## 2020-03-11 RX ADMIN — FAMOTIDINE SCH MG: 20 TABLET, FILM COATED ORAL at 21:16

## 2020-03-11 RX ADMIN — ENOXAPARIN SODIUM SCH MG: 40 INJECTION SUBCUTANEOUS at 09:04

## 2020-03-11 RX ADMIN — IPRATROPIUM BROMIDE AND ALBUTEROL SULFATE SCH ML: .5; 3 SOLUTION RESPIRATORY (INHALATION) at 20:30

## 2020-03-11 RX ADMIN — IPRATROPIUM BROMIDE AND ALBUTEROL SULFATE SCH ML: .5; 3 SOLUTION RESPIRATORY (INHALATION) at 11:42

## 2020-03-11 RX ADMIN — POTASSIUM CHLORIDE SCH MLS/HR: 14.9 INJECTION, SOLUTION INTRAVENOUS at 16:18

## 2020-03-11 NOTE — CONS
CONSULTATION



DATE OF SERVICE:

03/10/2020



REASON FOR CONSULTATION:

Pneumococcal sepsis.



HISTORY OF PRESENT ILLNESS:

The patient is a 40-year-old  male presenting to the ER at [facility] on the 8th of March with chief complaints of left-sided chest pain. The

patient's symptoms have been going on for a few days before he presented to the

hospital. He described the pain to the left side of the chest to be sharp and throbbing

and worse with taking a deep breath, intensity could be almost 7 to 8 out of 10 and no

radiation. The patient also had associated cough which has been moderate in intensity

and has been bringing up some yellow sputum.  No hemoptysis.  The patient has been

complaining of fever with chills with these symptoms and the patient was evaluated by

the ER physician.  On arrival to the ER, the patient has been afebrile.  The patient

did have elevated white count 26,000. The patient did have a normal creatinine,

procalcitonin 0.67.  Influenza PCR was negative.  The patient did have a CT angiogram

and that has been negative for PE; however, showed bilateral patchy pneumonia and

atelectasis.  The patient has been treated with vancomycin and Zosyn with blood

cultures now showing a Streptococcus pneumoniae that prompted Infectious Disease

consultation.



REVIEW OF SYSTEMS:

Positive points have been mentioned in HPI.  Rest of the systems are negative.



PAST MEDICAL HISTORY:

Chronic low back pain from herniated discs, also history of ADHD and anxiety.



PAST SURGICAL HISTORY:

He had no major surgery.



SOCIAL HISTORY:

Current everyday smoker.  Occasionally drinks and _____marijuana use.



FAMILY HISTORY:

No pertinent findings noticed.



ALLERGIES:

No known drug allergies.



MEDICATIONS:

Medications include the patient is currently on Tylenol, DuoNeb, Pulmicort, Lovenox,

Pepcid, Toradol, lactated Ringers, Solu-Medrol, Vancomycin and Zosyn.



PHYSICAL EXAMINATION:

Blood pressure is 135/70 with the pulse of 75, temperature 98.1. He is 96% on 2 L nasal

cannula.

General description is a middle-aged male up in the bed in no distress.  No tachypnea

or accessory muscle of respiration use.

HEENT: Examination shows no pallor or scleral icterus.  Oral mucous membrane is dry.

No pharyngeal erythema or thrush.

NECK: Trachea central. No thyromegaly.

LUNGS: Unlabored breathing. Decreased breath sounds at the bases. No wheeze.  HEART:

S1, S2.  Regular rate and rhythm.

ABDOMEN:  Soft, no tenderness.

EXTREMITIES: No edema of the feet.

SKIN EXAMINATION: No rash or mass palpable.

NEUROLOGIC: The patient is awake, alert, oriented.  Mood and affect normal.



LABS:

Hemoglobin is 14.6, white count 20.5, admission white count 26,000. BUN of 16,

creatinine 0.89.  Blood culture with strep pneumoniae.



DIAGNOSTIC IMPRESSION AND PLAN:

Patient with Streptococcus pneumoniae sepsis in this patient who presented to the

hospital with left-sided chest pain, likely secondary to pneumonia with no clear

history of recent antibiotic exposure could be sensitive pathogen.  However,  _____ not

entirely excluded.



PLAN:

1. Vancomycin pharmacy to dose target of 15 watching his kidney functions and Vanco

    trough closely.

2. Discontinue Zosyn.

3. Add Rocephin 2 grams daily.

4. We will follow up on clinical condition and culture to further adjust medication if

    needed.

Thank you for this consultation. Will follow this patient along with you.





MMODL / IJN: [id number] / Job#: [id number]

## 2020-03-11 NOTE — PN
PROGRESS NOTE



DATE OF SERVICE:

03/11/2020.



REASON FOR FOLLOWUP:

Pneumonia and strep pneumo bacteremia.



INTERVAL HISTORY:

The patient is currently afebrile.  The patient has been breathing more comfortably.

The patient denies having any chest pain.  Cough has decreased in intensity.  No

hemoptysis.  No nausea, no vomiting.  No abdominal pain.  No diarrhea.



PHYSICAL EXAMINATION:

Blood pressure 135/84 with a pulse of 72, temperature 98.5.  He is 95% on room air.

General description is a middle-aged male up in the bed in no distress.  Respiratory

system: Unlabored breathing.  Decreased intensity in the breath sounds in the base.  No

wheeze.  Heart S1, S2.  Regular rate and rhythm. Abdomen soft, no tenderness.



LABS:

Hemoglobin 13.4, white count of 3.1.  BUN of 17, creatinine 0.93.  Blood culture has

been finalized with strep pneumo, sensitive pathogen.



DIAGNOSTIC IMPRESSION AND PLAN:

1. Patient with step pneumo bacteremia, source is pneumonia left-sided.  The patient

    at this time seems to have shown clinical improvement.  Antibiotic will be adjusted

    to Rocephin 2 g daily.  Blood cultures will be repeated to  document clearance of

    his bacteremia.

2. Elevated white count more likely steroid effect, which has been discontinued

    yesterday.  Expect the white count did show downward trend.

This has been explained to the patient's family in layman's terms.  We will repeat a

CBC tomorrow.  Continue supportive care.





MMODL / IJN: [id number] / Job#: [id number]

## 2020-03-11 NOTE — P.PN
Progress Note - Text


Progress Note Date: 03/11/20





Chief Complaint: Cough, sputum





History of presenting complaint:


This is a pleasant 40 year patient of Dr. mason from [facility].  Patient 

normally in good health except for herniated disc and some neuropathy from 

there.  Patient's a .  Initially patient's 6-year-old son got sick and the

mother got sick.  For about a week.  Patient started of with vomiting, cough, 

fever of 103., tired rundown.  Also some diarrhea and aching muscles..  Patient 

come to the ER yesterday.  Patient in the ER was felt to be pericarditis and was

discharged home on colchicine and Motrin.


Patient presents was still cough yellow-brown sputum, significant left-sided 

pleuritic chest pain.  Predominantly with deep breathing or with coughing.  

Appetite has not been good.  Tired rundown.  Admitted.


Admitted with bilateral pneumonia, positive blood cultures for S pneumoniae.  

Started on IV vancomycin and Zosyn.


Today-.  Pleuritic chest pain improved.  Breathing much improved.  Breathing 

much better.  Oral intake better.  Wife at the bedside.  Eating better.





Review of systems: Was done for constitutional, cardiovascular, GI, pulmonary. 

relevant finding as above





Active Medications





Acetaminophen (Tylenol Tab)  650 mg PO Q6HR PRN


   PRN Reason: Mild Pain or Fever > 100.5


   Last Admin: 03/09/20 19:53 Dose:  650 mg


   Documented by: 


Albuterol/Ipratropium (Duoneb 0.5 Mg-3 Mg/3 Ml Soln)  3 ml INHALATION RT-QID [facility]


   Last Admin: 03/11/20 20:30 Dose:  3 ml


   Documented by: 


Budesonide (Pulmicort)  1 mg INHALATION RT-BID [facility]


   Last Admin: 03/11/20 20:30 Dose:  1 mg


   Documented by: 


Enoxaparin Sodium (Lovenox)  40 mg SQ DAILY [facility]


   Last Admin: 03/11/20 09:04 Dose:  40 mg


   Documented by: 


Famotidine (Pepcid)  20 mg PO BID [facility]


   Last Admin: 03/11/20 21:16 Dose:  20 mg


   Documented by: 


Lactated Ringer's (Lactated Ringers)  1,000 mls @ 125 mls/hr IV .Q8H [facility]


   Last Admin: 03/11/20 16:18 Dose:  125 mls/hr


   Documented by: 


Ceftriaxone Sodium 2 gm/ (Sodium Chloride)  50 mls @ 100 mls/hr IVPB Q24HR [facility]


   Last Admin: 03/11/20 16:18 Dose:  100 mls/hr


   Documented by: 


Ketorolac Tromethamine (Toradol)  15 mg IVP Q6HR PRN


   PRN Reason: Pain


   Stop: 03/14/20 07:51


   Last Admin: 03/10/20 07:59 Dose:  15 mg


   Documented by: 


Naloxone HCl (Narcan)  0.2 mg IV Q2M PRN


   PRN Reason: Opioid Reversal


Nicotine (Habitrol 21mg/24hr Patch)  1 patch TRANSDERM DAILY [facility]


   Last Admin: 03/11/20 09:04 Dose:  1 patch


   Documented by: 


Nicotine Polacrilex (Nicorette Gum)  2 mg BUCCAL Q4HR PRN


   PRN Reason: Nicotine Cravings


Prednisone ()  40 mg PO DAILY [facility]


   Last Admin: 03/11/20 09:05 Dose:  40 mg


   Documented by: 











Physical examination:


VITAL SIGNS: 98.5, 55, 16, Pressure 135/84, 95%


GENERAL: Sitting at the edge to bed.  Overall improved


EYES: Pupils equal.  Conjunctiva normal.


HEENT: External appearance of nose and ears normal, oral cavity grossly normal.


NECK: JVD not raised; masses not palpable.


HEART: First and second heart sounds are normal;  no edema.  No pericardial rub


LUNGS: Respiratory rate normal, decreased breath sounds


ABDOMEN: Soft,  nontender, liver spleen not palpable, no masses palpable.  


PSYCH: [Alert and oriented x3;  mood  and affect anxious.  








INVESTIGATIONS, reviewed in the clinical context:


White count 33.1 potassium 4.6 creatinine 0.93





Previous testing


Problem calcitonin 0.67


Blood culture-streptococcus pneumoniae


White count 26 hemoglobin 15.9, increased neutrophils, potassium 4.3, crit 0.95.


Influenza type A and B both negative.


EKG tracing-personally reviewed by me.  No ST segment changes sinus rhythm.  No 

evidence of pericarditis.


Chest CTA-bilateral infiltrates





Assessment:


-Acute bilateral S pneumoniae with positive blood cultures causing sepsis, POA, 

improving


-Acute COPD exacerbation in a smoker, improving


-Chronic nicotine dependence patient cigarette smoker


-Recreational marijuana use


-Left-sided pleurisy.





Plan:


Patient is clinically improving.  Fevers have come down.  Patient has been 

taking his bronchodilators.  Follow with ID.  Elevated white count from 

prednisone.  Cut back the dose to 30 mg tomorrow.  Repeat blood cultures been 

ordered for tomorrow morning per ID.  Discussed with patient and wife.  Patient 

very anxious to go home

## 2020-03-12 VITALS — SYSTOLIC BLOOD PRESSURE: 168 MMHG | DIASTOLIC BLOOD PRESSURE: 86 MMHG

## 2020-03-12 VITALS — HEART RATE: 78 BPM

## 2020-03-12 VITALS — TEMPERATURE: 97.6 F | RESPIRATION RATE: 18 BRPM

## 2020-03-12 LAB
ANION GAP SERPL CALC-SCNC: 10 MMOL/L
BASOPHILS # BLD AUTO: 0 K/UL (ref 0–0.2)
BASOPHILS NFR BLD AUTO: 0 %
BUN SERPL-SCNC: 18 MG/DL (ref 9–20)
CALCIUM SPEC-MCNC: 9.3 MG/DL (ref 8.4–10.2)
CHLORIDE SERPL-SCNC: 103 MMOL/L (ref 98–107)
CO2 SERPL-SCNC: 26 MMOL/L (ref 22–30)
EOSINOPHIL # BLD AUTO: 0 K/UL (ref 0–0.7)
EOSINOPHIL NFR BLD AUTO: 0 %
ERYTHROCYTE [DISTWIDTH] IN BLOOD BY AUTOMATED COUNT: 4.26 M/UL (ref 4.3–5.9)
ERYTHROCYTE [DISTWIDTH] IN BLOOD: 12.9 % (ref 11.5–15.5)
GLUCOSE SERPL-MCNC: 92 MG/DL (ref 74–99)
HCT VFR BLD AUTO: 38.9 % (ref 39–53)
HGB BLD-MCNC: 12.6 GM/DL (ref 13–17.5)
LYMPHOCYTES # SPEC AUTO: 2.9 K/UL (ref 1–4.8)
LYMPHOCYTES NFR SPEC AUTO: 20 %
MCH RBC QN AUTO: 29.7 PG (ref 25–35)
MCHC RBC AUTO-ENTMCNC: 32.5 G/DL (ref 31–37)
MCV RBC AUTO: 91.2 FL (ref 80–100)
MONOCYTES # BLD AUTO: 1.2 K/UL (ref 0–1)
MONOCYTES NFR BLD AUTO: 8 %
NEUTROPHILS # BLD AUTO: 10.2 K/UL (ref 1.3–7.7)
NEUTROPHILS NFR BLD AUTO: 69 %
PLATELET # BLD AUTO: 526 K/UL (ref 150–450)
POTASSIUM SERPL-SCNC: 3.8 MMOL/L (ref 3.5–5.1)
SODIUM SERPL-SCNC: 139 MMOL/L (ref 137–145)
WBC # BLD AUTO: 14.8 K/UL (ref 3.8–10.6)

## 2020-03-12 RX ADMIN — ENOXAPARIN SODIUM SCH: 40 INJECTION SUBCUTANEOUS at 08:31

## 2020-03-12 RX ADMIN — IPRATROPIUM BROMIDE AND ALBUTEROL SULFATE SCH: .5; 3 SOLUTION RESPIRATORY (INHALATION) at 11:27

## 2020-03-12 RX ADMIN — BUDESONIDE SCH MG: 1 SUSPENSION RESPIRATORY (INHALATION) at 07:40

## 2020-03-12 RX ADMIN — NICOTINE SCH PATCH: 21 PATCH, EXTENDED RELEASE TRANSDERMAL at 08:27

## 2020-03-12 RX ADMIN — POTASSIUM CHLORIDE SCH MLS/HR: 14.9 INJECTION, SOLUTION INTRAVENOUS at 02:02

## 2020-03-12 RX ADMIN — POTASSIUM CHLORIDE SCH MLS/HR: 14.9 INJECTION, SOLUTION INTRAVENOUS at 07:10

## 2020-03-12 RX ADMIN — IPRATROPIUM BROMIDE AND ALBUTEROL SULFATE SCH ML: .5; 3 SOLUTION RESPIRATORY (INHALATION) at 07:40

## 2020-03-12 RX ADMIN — FAMOTIDINE SCH MG: 20 TABLET, FILM COATED ORAL at 08:27

## 2020-03-12 NOTE — P.DS
Providers


Date of admission: 


03/08/20 23:57





Expected date of discharge: 03/12/20


Attending physician: 


Dawood Hernandez





Consults: 





                                        





03/10/20 12:30


Consult Physician Routine 


   Consulting Provider: Gordon Sebastian


   Consult Reason/Comments: Pneumococcal sepsis


   Do you want consulting provider notified?: Yes











Primary care physician: 


Michael Farfan





[facility] Course: 





Chief Complaint: Cough, sputum





History of presenting complaint:


This is a pleasant 40 year patient of Dr. farfan from [facility].  Patient 

normally in good health except for herniated disc and some neuropathy from 

there.  Patient's a .  Initially patient's 6-year-old son got sick and the

mother got sick.  For about a week.  Patient started of with vomiting, cough, 

fever of 103., tired rundown.  Also some diarrhea and aching muscles..  Patient 

come to the ER yesterday.  Patient in the ER was felt to be pericarditis and was

discharged home on colchicine and Motrin.


Patient presents was still cough yellow-brown sputum, significant left-sided 

pleuritic chest pain.  Predominantly with deep breathing or with coughing.  

Appetite has not been good.  Tired rundown.  Admitted.


Admitted with bilateral pneumonia, positive blood cultures for S pneumoniae.  

Started on IV vancomycin and Zosyn.


Today-.  doing much better.  Starting chest pain completely resolved.  Breathing

much improved.  Starting diet.  Up and about.  Care was discussed with the 

patient and wife.  Questions were answered.  Cleared by ID.





Consultation:


Dr. Sebastian from ID








Physical examination:


VITAL SIGNS: 97.6, 56, 18, blood pressure 160-86, 98% on room air


GENERAL: Sitting at the edge to bed. comfortable


EYES: Pupils equal.  Conjunctiva normal.


HEENT: External appearance of nose and ears normal, oral cavity grossly normal.


NECK: JVD not raised; masses not palpable.


HEART: First and second heart sounds are normal;  no edema.  No pericardial rub


LUNGS: Respiratory rate normal, decreased breath sounds


ABDOMEN: Soft,  nontender, liver spleen not palpable, no masses palpable.  


PSYCH: [Alert and oriented x3;  mood  and affect anxious.  








INVESTIGATIONS, reviewed in the clinical context:


white count 14.8 hemoglobin 12.6 potassium 3.8 pro calcitonin 0.19





Previous testing


Problem calcitonin 0.67


Blood culture-streptococcus pneumoniae


White count 26 hemoglobin 15.9, increased neutrophils, potassium 4.3, crit 0.95.


Influenza type A and B both negative.


EKG tracing-personally reviewed by me.  No ST segment changes sinus rhythm.  No 

evidence of pericarditis.


Chest CTA-bilateral infiltrates


HIV testing-negative





Assessment:


-Acute bilateral S pneumoniae with positive blood cultures causing sepsis, POA, 


-Acute COPD exacerbation in a smoker,


-Chronic nicotine dependence patient cigarette smoker


-Recreational marijuana use


-Left-sided pleurisy.





disposition:


Home


Patient Condition at Discharge: Stable





Plan - Discharge Summary


Discharge Rx Participant: No


New Discharge Prescriptions: 


New


   Cefuroxime Axetil [Ceftin] 500 mg PO BID #24 tab


   Nicotine 21Mg/24Hr Patch [Habitrol] 1 patch TRANSDERM DAILY #14 patch


   Nicotine Polacrilex [Nicorette] 2 mg BUCCAL Q4HR PRN #30 gum


     PRN Reason: Nicotine Cravings


   Acetaminophen Tab [Tylenol] 650 mg PO Q6HR PRN  tab


     PRN Reason: Mild Pain Or Fever > 100.5


   Albuterol Inhaler [Ventolin Hfa Inhaler] 1 - 2 puff INHALATION RT-Q6H PRN #1 

inhaler


     PRN Reason: Wheezing


   predniSONE 0 mg PO AS DIRECTED #6 tab


Discharge Medication List





Acetaminophen Tab [Tylenol] 650 mg PO Q6HR PRN  tab 03/12/20 [Rx]


Albuterol Inhaler [Ventolin Hfa Inhaler] 1 - 2 puff INHALATION RT-Q6H PRN #1 

inhaler 03/12/20 [Rx]


Cefuroxime Axetil [Ceftin] 500 mg PO BID #24 tab 03/12/20 [Rx]


Nicotine 21Mg/24Hr Patch [Habitrol] 1 patch TRANSDERM DAILY #14 patch 03/12/20 

[Rx]


Nicotine Polacrilex [Nicorette] 2 mg BUCCAL Q4HR PRN #30 gum 03/12/20 [Rx]


predniSONE 0 mg PO AS DIRECTED #6 tab 03/12/20 [Rx]








Follow up Appointment(s)/Referral(s): 


Michael Farfan MD [Primary Care Provider] - 03/16/20 2:30 pm


(Appointment set at [place] office


Address: [address] [place] [address]


Phone: [phone])


Gordon Sebastian MD [STAFF PHYSICIAN] - 03/24/20 10:30 am


Patient Instructions/Handouts:  Pneumonia (DC)


Discharge/Stand Alone Forms:  Work/School Release / Restrict

## 2020-03-12 NOTE — PN
PROGRESS NOTE



DATE OF SERVICE:

03/12/2020



REASON FOR FOLLOWUP:

Strep pneumo bacteremia and pneumonia.



INTERVAL HISTORY:

The patient is seen on rounds this morning.  Patient has been afebrile, breathing

comfortably.  Patient denies having any chest pain or shortness or cough.  No nausea,

vomiting, abdominal pain, no diarrhea.



PHYSICAL EXAMINATION:

Blood pressure is 152/86, pulse of 76 temperature is 97.6.  He is 90% we description is

a middle-aged male lying in bed in no distress respiratory system: Unlabored breathing.

Decreased breath sounds no wheeze.  Heart S1, S2.  Abdomen:  Soft, no tenderness.



LABS:

Hemoglobin is 12.4 abdominal wound 10.8, creatinine 1.0.  Blood culture is so far

incidents pending.



DIAGNOSTIC IMPRESSION AND PLAN:

Patient with step pneumo bacteremia, source is pneumonia.  The patient has been

insisting on going home.  He was already dressed up and wants to leave.  Does not want

to stay for the IV antibiotic therapy.  Antibiotics will be switched to a short course

of Ceftin 500 mg twice ad day for two weeks. Prescription sent to the pharmacy, advised

to follow up in the office in 1 week to make sure his blood cultures remain to be

negative and _____remains to be negative.





MMODL / IJN: [id number] / Job#: [id number]

## 2022-05-15 ENCOUNTER — HOSPITAL ENCOUNTER (EMERGENCY)
Dept: HOSPITAL 47 - EC | Age: 42
Discharge: HOME | End: 2022-05-15
Payer: COMMERCIAL

## 2022-05-15 VITALS
TEMPERATURE: 97.8 F | RESPIRATION RATE: 18 BRPM | DIASTOLIC BLOOD PRESSURE: 84 MMHG | HEART RATE: 64 BPM | SYSTOLIC BLOOD PRESSURE: 123 MMHG

## 2022-05-15 DIAGNOSIS — R42: Primary | ICD-10-CM

## 2022-05-15 DIAGNOSIS — F17.200: ICD-10-CM

## 2022-05-15 LAB
ALBUMIN SERPL-MCNC: 4.9 G/DL (ref 3.5–5)
ALP SERPL-CCNC: 96 U/L (ref 38–126)
ALT SERPL-CCNC: 30 U/L (ref 4–49)
ANION GAP SERPL CALC-SCNC: 9 MMOL/L
APTT BLD: 23.7 SEC (ref 22–30)
AST SERPL-CCNC: 29 U/L (ref 17–59)
BASOPHILS # BLD AUTO: 0.1 K/UL (ref 0–0.2)
BASOPHILS NFR BLD AUTO: 1 %
BUN SERPL-SCNC: 12 MG/DL (ref 9–20)
CALCIUM SPEC-MCNC: 9.6 MG/DL (ref 8.4–10.2)
CHLORIDE SERPL-SCNC: 107 MMOL/L (ref 98–107)
CO2 SERPL-SCNC: 25 MMOL/L (ref 22–30)
EOSINOPHIL # BLD AUTO: 0.3 K/UL (ref 0–0.7)
EOSINOPHIL NFR BLD AUTO: 4 %
ERYTHROCYTE [DISTWIDTH] IN BLOOD BY AUTOMATED COUNT: 5.22 M/UL (ref 4.3–5.9)
ERYTHROCYTE [DISTWIDTH] IN BLOOD: 13.3 % (ref 11.5–15.5)
GLUCOSE BLD-MCNC: 95 MG/DL (ref 75–99)
GLUCOSE SERPL-MCNC: 95 MG/DL (ref 74–99)
HCT VFR BLD AUTO: 49.3 % (ref 39–53)
HGB BLD-MCNC: 15.9 GM/DL (ref 13–17.5)
INR PPP: 0.9 (ref ?–1.2)
LYMPHOCYTES # SPEC AUTO: 1.8 K/UL (ref 1–4.8)
LYMPHOCYTES NFR SPEC AUTO: 23 %
MAGNESIUM SPEC-SCNC: 2.5 MG/DL (ref 1.6–2.3)
MCH RBC QN AUTO: 30.4 PG (ref 25–35)
MCHC RBC AUTO-ENTMCNC: 32.2 G/DL (ref 31–37)
MCV RBC AUTO: 94.5 FL (ref 80–100)
MONOCYTES # BLD AUTO: 0.5 K/UL (ref 0–1)
MONOCYTES NFR BLD AUTO: 7 %
NEUTROPHILS # BLD AUTO: 4.9 K/UL (ref 1.3–7.7)
NEUTROPHILS NFR BLD AUTO: 63 %
PLATELET # BLD AUTO: 368 K/UL (ref 150–450)
POTASSIUM SERPL-SCNC: 4.3 MMOL/L (ref 3.5–5.1)
PROT SERPL-MCNC: 7.6 G/DL (ref 6.3–8.2)
PT BLD: 10.1 SEC (ref 9–12)
SODIUM SERPL-SCNC: 141 MMOL/L (ref 137–145)
WBC # BLD AUTO: 7.8 K/UL (ref 3.8–10.6)

## 2022-05-15 PROCEDURE — 85730 THROMBOPLASTIN TIME PARTIAL: CPT

## 2022-05-15 PROCEDURE — 93005 ELECTROCARDIOGRAM TRACING: CPT

## 2022-05-15 PROCEDURE — 85610 PROTHROMBIN TIME: CPT

## 2022-05-15 PROCEDURE — 99284 EMERGENCY DEPT VISIT MOD MDM: CPT

## 2022-05-15 PROCEDURE — 84484 ASSAY OF TROPONIN QUANT: CPT

## 2022-05-15 PROCEDURE — 70450 CT HEAD/BRAIN W/O DYE: CPT

## 2022-05-15 PROCEDURE — 83735 ASSAY OF MAGNESIUM: CPT

## 2022-05-15 PROCEDURE — 80053 COMPREHEN METABOLIC PANEL: CPT

## 2022-05-15 PROCEDURE — 36415 COLL VENOUS BLD VENIPUNCTURE: CPT

## 2022-05-15 PROCEDURE — 71046 X-RAY EXAM CHEST 2 VIEWS: CPT

## 2022-05-15 PROCEDURE — 80320 DRUG SCREEN QUANTALCOHOLS: CPT

## 2022-05-15 PROCEDURE — 85025 COMPLETE CBC W/AUTO DIFF WBC: CPT

## 2022-05-15 PROCEDURE — 96374 THER/PROPH/DIAG INJ IV PUSH: CPT

## 2022-05-15 NOTE — ED
General Adult HPI





- General


Chief complaint: Dizziness


Stated complaint: Doesnt Fell well/off


Time Seen by Provider: 05/15/22 08:40


Source: patient, RN notes reviewed, old records reviewed


Mode of arrival: ambulatory


Limitations: no limitations





- History of Present Illness


Initial comments: 





This is a 42-year-old male who presents emergency Department complaining that 

last night he came very dizzy and anytime he moves his head the dizziness gets 

worse per patient states he has not noticed of staying still or closing his eyes

makes it better.  Patient states he was drinking last night which is typical for

him but he didn't drink that much compared to his baseline.  Patient states he 

stopped drinking about 8:00 last night.  Patient denies any headache patient 

denies any numbness or weakness.  Patient states she just feels like he is 

moving in the room is still.  Patient denies any nausea.  Patient states he was 

kind of sweaty last night when it started but that has subsided.  Patient denies

any chest pain shortness of breath palpitations.  Patient denies any recent 

fever chills or cough.  Patient denies any abdominal pain.





- Related Data


                                  Previous Rx's











 Medication  Instructions  Recorded


 


Meclizine [Antivert] 25 mg PO TID #20 tab 05/15/22











                                    Allergies











Allergy/AdvReac Type Severity Reaction Status Date / Time


 


No Known Allergies Allergy   Verified 05/15/22 10:26














Review of Systems


ROS Statement: 


Those systems with pertinent positive or pertinent negative responses have been 

documented in the HPI.





ROS Other: All systems not noted in ROS Statement are negative.





Past Medical History


Additional Past Medical History / Comment(s): nerve damage, ruptured/herniated 

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


History of Any Multi-Drug Resistant Organisms: None Reported


Past Surgical History: No Surgical Hx Reported


Past Psychological History: No Psychological Hx Reported, ADD/ADHD, Anxiety


Smoking Status: Current every day smoker


Past Alcohol Use History: Daily


Past Drug Use History: Marijuana





- Past Family History


  ** Father


History Unknown: Yes





  ** Mother


History Unknown: Yes





General Exam





- General Exam Comments


Initial Comments: 





GENERAL:


Patient is well-developed and well-nourished.  Patient is nontoxic and well-

hydrated and is in mild distress.





ENT:


Neck is soft and supple.  No significant lymphadenopathy is noted.  Oropharynx 

is clear.  Moist mucous membranes.  Neck has full range of motion without 

eliciting any pain.  





EYES:


The sclera were anicteric and conjunctiva were pink and moist.  Extraocular 

movements were intact and pupils were equal round and reactive to light.  

Eyelids were unremarkable.





PULMONARY:


Unlabored respirations.  Good breath sounds bilaterally.  No audible rales 

rhonchi or wheezing was noted.





CARDIOVASCULAR:


There is a regular rate and rhythm without any murmurs gallops or rubs.  





ABDOMEN:


Soft and nontender with normal bowel sounds.  





SKIN:


Skin is clear with no lesions or rashes and otherwise unremarkable.





NEUROLOGIC:


Patient is alert and oriented x3.  Cranial nerves II through XII are grossly int

act.  Motor and sensory are also intact.  Normal speech, volume and content.  

Symmetrical smile.  Patient's finger to nose is normal bilaterally





MUSCULOSKELETAL:


Normal extremities with adequate strength and full range of motion. 





LYMPHATICS:


No significant lymphadenopathy is noted





PSYCHIATRIC:


Normal psychiatric evaluation. 


Limitations: no limitations





Course


                                   Vital Signs











  05/15/22 05/15/22





  08:38 08:41


 


Temperature 98.2 F 


 


Pulse Rate 85 


 


Respiratory 16 





Rate  


 


Blood Pressure  159/89


 


O2 Sat by Pulse 96 





Oximetry  














Medical Decision Making





- Medical Decision Making





EKG shows sinus rhythm at 67 bpm LA interval 254 Lou is 90 QT interval 397 

QTC is 413.  Patient's EKG shows no ST segment elevation or depression. 





Patient received a scopolamine patch Antivert and some Valium.  I went back in 

and evaluated the patient later he was feeling considerably better.





CT of the brain shows no acute abnormality.





Chest x-ray shows no acute abnormality.





- Lab Data


Result diagrams: 


                                 05/15/22 09:01





                                 05/15/22 09:01


                                   Lab Results











  05/15/22 05/15/22 05/15/22 Range/Units





  09:01 09:01 09:01 


 


WBC  7.8    (3.8-10.6)  k/uL


 


RBC  5.22    (4.30-5.90)  m/uL


 


Hgb  15.9    (13.0-17.5)  gm/dL


 


Hct  49.3    (39.0-53.0)  %


 


MCV  94.5    (80.0-100.0)  fL


 


MCH  30.4    (25.0-35.0)  pg


 


MCHC  32.2    (31.0-37.0)  g/dL


 


RDW  13.3    (11.5-15.5)  %


 


Plt Count  368    (150-450)  k/uL


 


MPV  6.8    


 


Neutrophils %  63    %


 


Lymphocytes %  23    %


 


Monocytes %  7    %


 


Eosinophils %  4    %


 


Basophils %  1    %


 


Neutrophils #  4.9    (1.3-7.7)  k/uL


 


Lymphocytes #  1.8    (1.0-4.8)  k/uL


 


Monocytes #  0.5    (0-1.0)  k/uL


 


Eosinophils #  0.3    (0-0.7)  k/uL


 


Basophils #  0.1    (0-0.2)  k/uL


 


PT   10.1   (9.0-12.0)  sec


 


INR   0.9   (<1.2)  


 


APTT   23.7   (22.0-30.0)  sec


 


Sodium    141  (137-145)  mmol/L


 


Potassium    4.3  (3.5-5.1)  mmol/L


 


Chloride    107  ()  mmol/L


 


Carbon Dioxide    25  (22-30)  mmol/L


 


Anion Gap    9  mmol/L


 


BUN    12  (9-20)  mg/dL


 


Creatinine    1.07  (0.66-1.25)  mg/dL


 


Est GFR (CKD-EPI)AfAm    >90  (>60 ml/min/1.73 sqM)  


 


Est GFR (CKD-EPI)NonAf    86  (>60 ml/min/1.73 sqM)  


 


Glucose    95  (74-99)  mg/dL


 


POC Glucose (mg/dL)     (75-99)  mg/dL


 


POC Glu Operater ID     


 


Calcium    9.6  (8.4-10.2)  mg/dL


 


Magnesium    2.5 H  (1.6-2.3)  mg/dL


 


Total Bilirubin    0.7  (0.2-1.3)  mg/dL


 


AST    29  (17-59)  U/L


 


ALT    30  (4-49)  U/L


 


Alkaline Phosphatase    96  ()  U/L


 


Troponin I     (0.000-0.034)  ng/mL


 


Total Protein    7.6  (6.3-8.2)  g/dL


 


Albumin    4.9  (3.5-5.0)  g/dL


 


Serum Alcohol    37  mg/dL














  05/15/22 05/15/22 Range/Units





  09:01 09:01 


 


WBC    (3.8-10.6)  k/uL


 


RBC    (4.30-5.90)  m/uL


 


Hgb    (13.0-17.5)  gm/dL


 


Hct    (39.0-53.0)  %


 


MCV    (80.0-100.0)  fL


 


MCH    (25.0-35.0)  pg


 


MCHC    (31.0-37.0)  g/dL


 


RDW    (11.5-15.5)  %


 


Plt Count    (150-450)  k/uL


 


MPV    


 


Neutrophils %    %


 


Lymphocytes %    %


 


Monocytes %    %


 


Eosinophils %    %


 


Basophils %    %


 


Neutrophils #    (1.3-7.7)  k/uL


 


Lymphocytes #    (1.0-4.8)  k/uL


 


Monocytes #    (0-1.0)  k/uL


 


Eosinophils #    (0-0.7)  k/uL


 


Basophils #    (0-0.2)  k/uL


 


PT    (9.0-12.0)  sec


 


INR    (<1.2)  


 


APTT    (22.0-30.0)  sec


 


Sodium    (137-145)  mmol/L


 


Potassium    (3.5-5.1)  mmol/L


 


Chloride    ()  mmol/L


 


Carbon Dioxide    (22-30)  mmol/L


 


Anion Gap    mmol/L


 


BUN    (9-20)  mg/dL


 


Creatinine    (0.66-1.25)  mg/dL


 


Est GFR (CKD-EPI)AfAm    (>60 ml/min/1.73 sqM)  


 


Est GFR (CKD-EPI)NonAf    (>60 ml/min/1.73 sqM)  


 


Glucose    (74-99)  mg/dL


 


POC Glucose (mg/dL)   95  (75-99)  mg/dL


 


POC Glu Operater ID   [facility]  


 


Calcium    (8.4-10.2)  mg/dL


 


Magnesium    (1.6-2.3)  mg/dL


 


Total Bilirubin    (0.2-1.3)  mg/dL


 


AST    (17-59)  U/L


 


ALT    (4-49)  U/L


 


Alkaline Phosphatase    ()  U/L


 


Troponin I  <0.012   (0.000-0.034)  ng/mL


 


Total Protein    (6.3-8.2)  g/dL


 


Albumin    (3.5-5.0)  g/dL


 


Serum Alcohol    mg/dL














Disposition


Clinical Impression: 


 Vertigo





Disposition: HOME SELF-CARE


Condition: Good


Instructions (If sedation given, give patient instructions):  Vertigo (ED)


Prescriptions: 


Meclizine [Antivert] 25 mg PO TID #20 tab


Is patient prescribed a controlled substance at d/c from ED?: No


Referrals: 


Michael Farfan MD [Primary Care Provider] - 1-2 days


Time of Disposition: 11:42

## 2022-05-15 NOTE — XR
EXAMINATION TYPE: XR chest 2V

 

DATE OF EXAM: 5/15/2022

 

COMPARISON: 3/8/2020

 

HISTORY: 42 year-old male chest pain, dizziness

 

TECHNIQUE:  PA and lateral views

 

FINDINGS:  

Heart normal size. Aorta and pulmonary vasculature within normal limits. No consolidation or pleural 
effusion. Hyperinflation. Bilateral nipple shadows and a right-sided nipple ring.

 

 

IMPRESSION:  

Hyperinflation which could be from depth of inspiration or underlying emphysema. Clinically correlate
. Otherwise, no acute process seen.

## 2022-05-15 NOTE — CT
EXAMINATION TYPE: CT brain wo con

 

DATE OF EXAM: 5/15/2022

 

COMPARISON: 12/2/2015

 

HISTORY: 42-year-old male vertigo, Dizziness.

 

TECHNIQUE:  Examination was done in axial plane without intravenous contrast.  Coronal and sagittal r
econstructions performed.

 

CT DLP: 1159.4 mGycm

Automated exposure control for dose reduction was used.

 

FINDINGS:

There is no evidence of  acute intracranial hemorrhage, acute ischemic changes, mass, mass-effect, or
 extra-axial fluid collection.  There is no effacement of cerebral sulci or basal subarachnoid cister
ns.  There is no hydrocephalus.  There is no midline shift.  Gray-white matter distinction is preserv
ed.

 

1 cm polyp or mucosal retention cyst lateral wall left maxillary sinus. Trace mucosal thickening ethm
oid air cells. Mastoid air cells well pneumatized. Orbits and globes are intact.

 

 

IMPRESSION:

No acute intracranial abnormality seen.